# Patient Record
Sex: FEMALE | Race: WHITE | NOT HISPANIC OR LATINO | Employment: OTHER | URBAN - METROPOLITAN AREA
[De-identification: names, ages, dates, MRNs, and addresses within clinical notes are randomized per-mention and may not be internally consistent; named-entity substitution may affect disease eponyms.]

---

## 2017-10-01 ENCOUNTER — APPOINTMENT (EMERGENCY)
Dept: CT IMAGING | Facility: HOSPITAL | Age: 47
End: 2017-10-01
Payer: COMMERCIAL

## 2017-10-01 ENCOUNTER — HOSPITAL ENCOUNTER (OUTPATIENT)
Facility: HOSPITAL | Age: 47
Setting detail: OBSERVATION
Discharge: HOME | End: 2017-10-03
Attending: EMERGENCY MEDICINE | Admitting: INTERNAL MEDICINE
Payer: COMMERCIAL

## 2017-10-01 ENCOUNTER — APPOINTMENT (EMERGENCY)
Dept: RADIOLOGY | Facility: HOSPITAL | Age: 47
End: 2017-10-01
Payer: COMMERCIAL

## 2017-10-01 DIAGNOSIS — G45.9 TIA (TRANSIENT ISCHEMIC ATTACK): Primary | ICD-10-CM

## 2017-10-01 LAB
ALBUMIN SERPL BCP-MCNC: 3.1 G/DL (ref 3.5–5)
ALP SERPL-CCNC: 75 U/L (ref 46–116)
ALT SERPL W P-5'-P-CCNC: 19 U/L (ref 12–78)
ANION GAP SERPL CALCULATED.3IONS-SCNC: 9 MMOL/L (ref 4–13)
AST SERPL W P-5'-P-CCNC: 9 U/L (ref 5–45)
BASOPHILS # BLD AUTO: 0.09 THOUSANDS/ΜL (ref 0–0.1)
BASOPHILS NFR BLD AUTO: 1 % (ref 0–1)
BILIRUB SERPL-MCNC: 0.3 MG/DL (ref 0.2–1)
BUN SERPL-MCNC: 16 MG/DL (ref 5–25)
CALCIUM SERPL-MCNC: 8.7 MG/DL (ref 8.3–10.1)
CHLORIDE SERPL-SCNC: 102 MMOL/L (ref 100–108)
CO2 SERPL-SCNC: 29 MMOL/L (ref 21–32)
CREAT SERPL-MCNC: 0.89 MG/DL (ref 0.6–1.3)
EOSINOPHIL # BLD AUTO: 2.41 THOUSAND/ΜL (ref 0–0.61)
EOSINOPHIL NFR BLD AUTO: 13 % (ref 0–6)
ERYTHROCYTE [DISTWIDTH] IN BLOOD BY AUTOMATED COUNT: 13.5 % (ref 11.6–15.1)
GFR SERPL CREATININE-BSD FRML MDRD: 77 ML/MIN/1.73SQ M
GLUCOSE SERPL-MCNC: 104 MG/DL (ref 65–140)
HCT VFR BLD AUTO: 38.2 % (ref 34.8–46.1)
HGB BLD-MCNC: 12.3 G/DL (ref 11.5–15.4)
INR PPP: 0.84 (ref 0.86–1.16)
LYMPHOCYTES # BLD AUTO: 3.68 THOUSANDS/ΜL (ref 0.6–4.47)
LYMPHOCYTES NFR BLD AUTO: 20 % (ref 14–44)
MCH RBC QN AUTO: 28.5 PG (ref 26.8–34.3)
MCHC RBC AUTO-ENTMCNC: 32.2 G/DL (ref 31.4–37.4)
MCV RBC AUTO: 88 FL (ref 82–98)
MONOCYTES # BLD AUTO: 0.81 THOUSAND/ΜL (ref 0.17–1.22)
MONOCYTES NFR BLD AUTO: 4 % (ref 4–12)
NEUTROPHILS # BLD AUTO: 11.57 THOUSANDS/ΜL (ref 1.85–7.62)
NEUTS SEG NFR BLD AUTO: 62 % (ref 43–75)
NRBC BLD AUTO-RTO: 0 /100 WBCS
PLATELET # BLD AUTO: 403 THOUSANDS/UL (ref 149–390)
PMV BLD AUTO: 9 FL (ref 8.9–12.7)
POTASSIUM SERPL-SCNC: 3.5 MMOL/L (ref 3.5–5.3)
PROT SERPL-MCNC: 7.1 G/DL (ref 6.4–8.2)
PROTHROMBIN TIME: 11.7 SECONDS (ref 12.1–14.4)
RBC # BLD AUTO: 4.32 MILLION/UL (ref 3.81–5.12)
SODIUM SERPL-SCNC: 140 MMOL/L (ref 136–145)
TROPONIN I SERPL-MCNC: <0.02 NG/ML
WBC # BLD AUTO: 18.65 THOUSAND/UL (ref 4.31–10.16)

## 2017-10-01 PROCEDURE — 71020 HB CHEST X-RAY 2VW FRONTAL&LATL: CPT

## 2017-10-01 PROCEDURE — 96361 HYDRATE IV INFUSION ADD-ON: CPT

## 2017-10-01 PROCEDURE — 36415 COLL VENOUS BLD VENIPUNCTURE: CPT | Performed by: EMERGENCY MEDICINE

## 2017-10-01 PROCEDURE — 93005 ELECTROCARDIOGRAM TRACING: CPT | Performed by: EMERGENCY MEDICINE

## 2017-10-01 PROCEDURE — 96360 HYDRATION IV INFUSION INIT: CPT

## 2017-10-01 PROCEDURE — 85610 PROTHROMBIN TIME: CPT | Performed by: EMERGENCY MEDICINE

## 2017-10-01 PROCEDURE — 80053 COMPREHEN METABOLIC PANEL: CPT | Performed by: EMERGENCY MEDICINE

## 2017-10-01 PROCEDURE — 70450 CT HEAD/BRAIN W/O DYE: CPT

## 2017-10-01 PROCEDURE — 85025 COMPLETE CBC W/AUTO DIFF WBC: CPT | Performed by: EMERGENCY MEDICINE

## 2017-10-01 PROCEDURE — 84484 ASSAY OF TROPONIN QUANT: CPT | Performed by: EMERGENCY MEDICINE

## 2017-10-01 RX ORDER — IBUPROFEN 800 MG/1
TABLET ORAL AS NEEDED
COMMUNITY

## 2017-10-01 RX ORDER — PANTOPRAZOLE SODIUM 20 MG/1
40 TABLET, DELAYED RELEASE ORAL DAILY
COMMUNITY
End: 2018-06-13 | Stop reason: SDUPTHER

## 2017-10-01 RX ORDER — LOVASTATIN 20 MG/1
30 TABLET ORAL
COMMUNITY

## 2017-10-01 RX ORDER — DOCUSATE SODIUM 100 MG/1
100 CAPSULE, LIQUID FILLED ORAL DAILY
COMMUNITY

## 2017-10-01 RX ORDER — LISINOPRIL 5 MG/1
5 TABLET ORAL DAILY
COMMUNITY
End: 2018-06-18 | Stop reason: ALTCHOICE

## 2017-10-01 RX ORDER — ASPIRIN 81 MG/1
81 TABLET ORAL DAILY
COMMUNITY
End: 2018-06-13 | Stop reason: SDUPTHER

## 2017-10-01 RX ORDER — HYDROCHLOROTHIAZIDE 25 MG/1
25 TABLET ORAL DAILY
COMMUNITY
End: 2018-02-14

## 2017-10-01 RX ORDER — MELATONIN
1000 DAILY
COMMUNITY
End: 2018-06-13 | Stop reason: SDUPTHER

## 2017-10-01 RX ADMIN — SODIUM CHLORIDE 1000 ML: 0.9 INJECTION, SOLUTION INTRAVENOUS at 21:30

## 2017-10-01 NOTE — ED PROVIDER NOTES
History  Chief Complaint   Patient presents with    Numbness     Patient presents to the ED with c/o numbness starting behind her right ear, down her right arm, and into her right leg  Patient states that this has been happening for about a week and was seen at [facility] where stroke was ruled out     42-year-old female presents for evaluation of right-sided weakness and numbness that started approximately 1 week ago however worsened today  She was seen at [facility] 1 week ago when this initially started in had a stroke evaluation performed  This returned normal, she was discharged  She is unable to get follow-up with a neurologist until November 10th  Today she awoke an stated that she had worsening weakness in her right side, now with slurred speech, right-sided facial droop, difficulty with ambulation because of the leg weakness  This is worse than her symptoms were in the past when she was admitted to [facility]  She has a hx of tia in the past which appeared similar  She has no other complaints at this time  She denies CP, no SOB, no fever/chills, no N/V/D/C, no urinary symptoms            Prior to Admission Medications   Prescriptions Last Dose Informant Patient Reported?  Taking?   aspirin (ECOTRIN LOW STRENGTH) 81 mg EC tablet   Yes Yes   Sig: Take 81 mg by mouth daily   cholecalciferol (VITAMIN D3) 1,000 units tablet   Yes Yes   Sig: Take 1,000 Units by mouth daily   docusate sodium (COLACE) 100 mg capsule   Yes Yes   Sig: Take 100 mg by mouth 2 (two) times a day   hydrochlorothiazide (HYDRODIURIL) 25 mg tablet   Yes Yes   Sig: Take 25 mg by mouth daily   ibuprofen (MOTRIN) 800 mg tablet   Yes Yes   Sig: Take by mouth every 6 (six) hours as needed for mild pain   lisinopril (ZESTRIL) 5 mg tablet   Yes Yes   Sig: Take 5 mg by mouth daily   lovastatin (MEVACOR) 20 mg tablet   Yes Yes   Sig: Take 20 mg by mouth daily at bedtime   pantoprazole (PROTONIX) 20 mg tablet   Yes Yes   Sig: Take 40 mg by mouth daily      Facility-Administered Medications: None       Past Medical History:   Diagnosis Date    Hypertension        No past surgical history on file  No family history on file  I have reviewed and agree with the history as documented  Social History   Substance Use Topics    Smoking status: Not on file    Smokeless tobacco: Not on file    Alcohol use Not on file        Review of Systems   Constitutional: Negative for chills, fatigue and fever  HENT: Negative for congestion and sore throat  Eyes: Positive for visual disturbance  Negative for photophobia  Respiratory: Negative for cough, chest tightness and shortness of breath  Cardiovascular: Negative for chest pain and palpitations  Gastrointestinal: Negative for abdominal pain, constipation, diarrhea, nausea and vomiting  Genitourinary: Negative for dysuria and flank pain  Musculoskeletal: Negative for back pain, neck pain and neck stiffness  Skin: Negative for color change and rash  Allergic/Immunologic: Negative for immunocompromised state  Neurological: Positive for facial asymmetry, speech difficulty, weakness, numbness and headaches  Negative for dizziness and syncope  Psychiatric/Behavioral: Negative for confusion  Physical Exam  ED Triage Vitals   Temperature Pulse Respirations Blood Pressure SpO2   10/01/17 1842 10/01/17 1839 10/01/17 1839 10/01/17 1839 10/01/17 1839   (!) 97 3 °F (36 3 °C) (!) 107 18 125/83 98 %      Temp Source Heart Rate Source Patient Position - Orthostatic VS BP Location FiO2 (%)   10/01/17 1842 10/01/17 2127 10/01/17 2127 10/01/17 2127 --   Temporal Monitor Lying Left arm       Pain Score       --                  Physical Exam   Constitutional: She is oriented to person, place, and time  She appears well-developed and well-nourished  No distress  HENT:   Head: Normocephalic and atraumatic     Mouth/Throat: Oropharynx is clear and moist    Mild right facial droop with mild slurred speech   Eyes: Conjunctivae and EOM are normal  Pupils are equal, round, and reactive to light  Right eye exhibits no discharge  Left eye exhibits no discharge  No scleral icterus  No visual field deficit, no nystagmus   Neck: Normal range of motion  Neck supple  No JVD present  Cardiovascular: Normal rate, regular rhythm, normal heart sounds and intact distal pulses  Exam reveals no gallop and no friction rub  No murmur heard  Pulmonary/Chest: Effort normal and breath sounds normal  No respiratory distress  She has no wheezes  She has no rales  She exhibits no tenderness  Abdominal: Soft  Bowel sounds are normal  She exhibits no distension  There is no tenderness  There is no guarding  Musculoskeletal: She exhibits no edema, tenderness or deformity  Right arm drift, right leg weakness  Neurological: She is alert and oriented to person, place, and time  A cranial nerve deficit is present  Mild right-sided facial droop with mild slurred speech, difficulty with word finding  Skin: Skin is warm and dry  No rash noted  She is not diaphoretic  No erythema  No pallor  Psychiatric: She has a normal mood and affect  Her behavior is normal    Vitals reviewed        ED Medications  Medications   sodium chloride 0 9 % bolus 1,000 mL (1,000 mL Intravenous New Bag 10/1/17 2130)       Diagnostic Studies  Labs Reviewed   CBC AND DIFFERENTIAL - Abnormal        Result Value Ref Range Status    WBC 18 65 (*) 4 31 - 10 16 Thousand/uL Final    Platelets 214 (*) 170 - 390 Thousands/uL Final    Eosinophils Relative 13 (*) 0 - 6 % Final    Neutrophils Absolute 11 57 (*) 1 85 - 7 62 Thousands/µL Final    Eosinophils Absolute 2 41 (*) 0 00 - 0 61 Thousand/µL Final    RBC 4 32  3 81 - 5 12 Million/uL Final    Hemoglobin 12 3  11 5 - 15 4 g/dL Final    Hematocrit 38 2  34 8 - 46 1 % Final    MCV 88  82 - 98 fL Final    MCH 28 5  26 8 - 34 3 pg Final    MCHC 32 2  31 4 - 37 4 g/dL Final    RDW 13 5  11 6 - 15 1 % Final    MPV 9 0  8 9 - 12 7 fL Final    nRBC 0  /100 WBCs Final    Neutrophils Relative 62  43 - 75 % Final    Lymphocytes Relative 20  14 - 44 % Final    Monocytes Relative 4  4 - 12 % Final    Basophils Relative 1  0 - 1 % Final    Lymphocytes Absolute 3 68  0 60 - 4 47 Thousands/µL Final    Monocytes Absolute 0 81  0 17 - 1 22 Thousand/µL Final    Basophils Absolute 0 09  0 00 - 0 10 Thousands/µL Final   COMPREHENSIVE METABOLIC PANEL - Abnormal     Albumin 3 1 (*) 3 5 - 5 0 g/dL Final    Sodium 140  136 - 145 mmol/L Final    Potassium 3 5  3 5 - 5 3 mmol/L Final    Chloride 102  100 - 108 mmol/L Final    CO2 29  21 - 32 mmol/L Final    Anion Gap 9  4 - 13 mmol/L Final    BUN 16  5 - 25 mg/dL Final    Creatinine 0 89  0 60 - 1 30 mg/dL Final    Comment: Standardized to IDMS reference method    Glucose 104  65 - 140 mg/dL Final    Comment:   If the patient is fasting, the ADA then defines impaired fasting glucose as > 100 mg/dL and diabetes as > or equal to 123 mg/dL  Specimen collection should occur prior to Sulfasalazine administration due to the potential for falsely depressed results  Specimen collection should occur prior to Sulfapyridine administration due to the potential for falsely elevated results  Calcium 8 7  8 3 - 10 1 mg/dL Final    AST 9  5 - 45 U/L Final    Comment:   Specimen collection should occur prior to Sulfasalazine administration due to the potential for falsely depressed results  ALT 19  12 - 78 U/L Final    Comment:   Specimen collection should occur prior to Sulfasalazine administration due to the potential for falsely depressed results       Alkaline Phosphatase 75  46 - 116 U/L Final    Total Protein 7 1  6 4 - 8 2 g/dL Final    Total Bilirubin 0 30  0 20 - 1 00 mg/dL Final    eGFR 77  ml/min/1 73sq m Final    Narrative:     National Kidney Disease Education Program recommendations are as follows:  GFR calculation is accurate only with a steady state creatinine  Chronic Kidney disease less than 60 ml/min/1 73 sq  meters  Kidney failure less than 15 ml/min/1 73 sq  meters  PROTIME-INR - Abnormal     Protime 11 7 (*) 12 1 - 14 4 seconds Final    INR 0 84 (*) 0 86 - 1 16 Final   TROPONIN I - Normal    Troponin I <0 02  <=0 04 ng/mL Final    Comment: 3Autovalidation override    Narrative:     [employer] Chemistry analyzer 99% cutoff is > 0 04 ng/mL in network labs    o cTnI 99% cutoff is useful only when applied to patients in the clinical setting of myocardial ischemia  o cTnI 99% cutoff should be interpreted in the context of clinical history, ECG findings and possibly cardiac imaging to establish correct diagnosis  o cTnI 99% cutoff may be suggestive but clearly not indicative of a coronary event without the clinical setting of myocardial ischemia  X-ray chest 2 views   ED Interpretation   No acute cardiopulmonary abnormalities      CT head without contrast   ED Interpretation   1  No acute intracranial abnormality  Microangiopathic changes        2   Small calcified extra-axial focus right frontal lobe, likely   meningioma  Final Result      1  No acute intracranial abnormality  Microangiopathic changes  2   Small calcified extra-axial focus right frontal lobe, likely meningioma           Workstation performed: [id number]             Procedures  ECG 12 Lead Documentation  Date/Time: 10/1/2017 6:48 PM  Performed by: Noelle Brock by: Sherry Ruiz     Indications / Diagnosis:  Stroke symptoms  ECG reviewed by me, the ED Provider: yes    Patient location:  ED  Previous ECG:     Previous ECG:  Unavailable    Comparison to cardiac monitor: Yes    Interpretation:     Interpretation: non-specific    Rate:     ECG rate:  104    ECG rate assessment: tachycardic    Rhythm:     Rhythm: sinus tachycardia    Ectopy:     Ectopy: none    QRS:     QRS axis:  Normal    QRS intervals:  Normal  Conduction:     Conduction: normal    ST segments: ST segments:  Non-specific  T waves:     T waves: normal            Phone Contacts  ED Phone Contact    ED Course  ED Course as of Oct 02 0039   Isael Sidhu Oct 01, 2017   2132 WBC: (!) 18 65             NIH Stroke Scale    Flowsheet Row Most Recent Value   Level of Consciousness (1a )  0 Filed at: 10/02/2017 0026   LOC Questions (1b )  0 Filed at: 10/02/2017 0026   LOC Commands (1c )  0 Filed at: 10/02/2017 0026   Best Gaze (2 )  0 Filed at: 10/02/2017 0026   Visual (3 )  0 Filed at: 10/02/2017 0026   Facial Palsy (4 )  1 Filed at: 10/02/2017 0026   Motor Arm, Left (5a )  0 Filed at: 10/02/2017 0026   Motor Arm, Right (5b )  1 Filed at: 10/02/2017 0026   Motor Leg, Left (6a )  0 Filed at: 10/02/2017 0026   Motor Leg, Right (6b )  2 Filed at: 10/02/2017 0026   Limb Ataxia (7 )  0 Filed at: 10/02/2017 0026   Sensory (8 )  0 Filed at: 10/02/2017 0026   Best Language (9 )  1 Filed at: 10/02/2017 0026   Dysarthria (10 )  1 Filed at: 10/02/2017 0026   Extinction and Inattention (11 ) (Formerly Neglect)  0 Filed at: 10/02/2017 0026   Total  6 Filed at: 10/02/2017 0026                        MDM  Number of Diagnoses or Management Options  TIA (transient ischemic attack):   Diagnosis management comments: Stroke symptoms  Was recently admitted to [facility] and had a full negative stroke workup including MRI however now her symptoms are worsening  She will have a full stroke workup performed here and likely will need admission to the hospital for further evaluation, Neurology follow-up, echocardiogram, etc   Patient is agreeable to this plan  CritCare Time    Disposition  Final diagnoses:   TIA (transient ischemic attack)     ED Disposition     ED Disposition Condition Comment    Admit  Case was discussed with Atif Arvizu [facility]) and the patient's admission status was agreed to be Admission Status: observation status to the service of Dr Yris Angel [facility])           Follow-up Information    None       Patient's Medications   Discharge Prescriptions    No medications on file     No discharge procedures on file      ED Provider  Electronically Signed by       Adrianne Hunter DO  10/02/17 6280

## 2017-10-02 ENCOUNTER — APPOINTMENT (OUTPATIENT)
Dept: MRI IMAGING | Facility: HOSPITAL | Age: 47
End: 2017-10-02
Payer: COMMERCIAL

## 2017-10-02 ENCOUNTER — APPOINTMENT (OUTPATIENT)
Dept: ULTRASOUND IMAGING | Facility: HOSPITAL | Age: 47
End: 2017-10-02
Payer: COMMERCIAL

## 2017-10-02 ENCOUNTER — APPOINTMENT (OUTPATIENT)
Dept: NON INVASIVE DIAGNOSTICS | Facility: CLINIC | Age: 47
End: 2017-10-02
Payer: COMMERCIAL

## 2017-10-02 ENCOUNTER — APPOINTMENT (OUTPATIENT)
Dept: NON INVASIVE DIAGNOSTICS | Facility: HOSPITAL | Age: 47
End: 2017-10-02
Payer: COMMERCIAL

## 2017-10-02 PROBLEM — I10 HTN (HYPERTENSION): Chronic | Status: ACTIVE | Noted: 2017-10-02

## 2017-10-02 PROBLEM — G45.9 TIA (TRANSIENT ISCHEMIC ATTACK): Status: ACTIVE | Noted: 2017-10-02

## 2017-10-02 LAB
ANION GAP SERPL CALCULATED.3IONS-SCNC: 7 MMOL/L (ref 4–13)
ATRIAL RATE: 104 BPM
BUN SERPL-MCNC: 17 MG/DL (ref 5–25)
CALCIUM SERPL-MCNC: 8.2 MG/DL (ref 8.3–10.1)
CHLORIDE SERPL-SCNC: 105 MMOL/L (ref 100–108)
CHOLEST SERPL-MCNC: 201 MG/DL (ref 50–200)
CO2 SERPL-SCNC: 29 MMOL/L (ref 21–32)
CREAT SERPL-MCNC: 0.78 MG/DL (ref 0.6–1.3)
ERYTHROCYTE [SEDIMENTATION RATE] IN BLOOD: 31 MM/HOUR (ref 0–20)
EST. AVERAGE GLUCOSE BLD GHB EST-MCNC: 126 MG/DL
GFR SERPL CREATININE-BSD FRML MDRD: 90 ML/MIN/1.73SQ M
GLUCOSE SERPL-MCNC: 132 MG/DL (ref 65–140)
HBA1C MFR BLD: 6 % (ref 4.2–6.3)
HDLC SERPL-MCNC: 42 MG/DL (ref 40–60)
LDLC SERPL CALC-MCNC: 123 MG/DL (ref 0–100)
P AXIS: 25 DEGREES
POTASSIUM SERPL-SCNC: 3.7 MMOL/L (ref 3.5–5.3)
PR INTERVAL: 136 MS
QRS AXIS: -15 DEGREES
QRSD INTERVAL: 94 MS
QT INTERVAL: 352 MS
QTC INTERVAL: 462 MS
SODIUM SERPL-SCNC: 141 MMOL/L (ref 136–145)
T WAVE AXIS: 19 DEGREES
TRIGL SERPL-MCNC: 178 MG/DL
VENTRICULAR RATE: 104 BPM

## 2017-10-02 PROCEDURE — G8988 SELF CARE GOAL STATUS: HCPCS

## 2017-10-02 PROCEDURE — 80061 LIPID PANEL: CPT | Performed by: PHYSICIAN ASSISTANT

## 2017-10-02 PROCEDURE — 85652 RBC SED RATE AUTOMATED: CPT | Performed by: PSYCHIATRY & NEUROLOGY

## 2017-10-02 PROCEDURE — 99285 EMERGENCY DEPT VISIT HI MDM: CPT

## 2017-10-02 PROCEDURE — 97166 OT EVAL MOD COMPLEX 45 MIN: CPT

## 2017-10-02 PROCEDURE — 70551 MRI BRAIN STEM W/O DYE: CPT

## 2017-10-02 PROCEDURE — G8979 MOBILITY GOAL STATUS: HCPCS

## 2017-10-02 PROCEDURE — G8978 MOBILITY CURRENT STATUS: HCPCS

## 2017-10-02 PROCEDURE — 36415 COLL VENOUS BLD VENIPUNCTURE: CPT | Performed by: PHYSICIAN ASSISTANT

## 2017-10-02 PROCEDURE — 80048 BASIC METABOLIC PNL TOTAL CA: CPT | Performed by: PHYSICIAN ASSISTANT

## 2017-10-02 PROCEDURE — 83036 HEMOGLOBIN GLYCOSYLATED A1C: CPT | Performed by: PHYSICIAN ASSISTANT

## 2017-10-02 PROCEDURE — 93306 TTE W/DOPPLER COMPLETE: CPT

## 2017-10-02 PROCEDURE — 93880 EXTRACRANIAL BILAT STUDY: CPT

## 2017-10-02 PROCEDURE — 97163 PT EVAL HIGH COMPLEX 45 MIN: CPT

## 2017-10-02 PROCEDURE — G8987 SELF CARE CURRENT STATUS: HCPCS

## 2017-10-02 PROCEDURE — 86618 LYME DISEASE ANTIBODY: CPT | Performed by: PSYCHIATRY & NEUROLOGY

## 2017-10-02 RX ORDER — ONDANSETRON 2 MG/ML
4 INJECTION INTRAMUSCULAR; INTRAVENOUS EVERY 6 HOURS PRN
Status: DISCONTINUED | OUTPATIENT
Start: 2017-10-02 | End: 2017-10-03 | Stop reason: HOSPADM

## 2017-10-02 RX ORDER — PANTOPRAZOLE SODIUM 40 MG/1
40 TABLET, DELAYED RELEASE ORAL
Status: DISCONTINUED | OUTPATIENT
Start: 2017-10-02 | End: 2017-10-03 | Stop reason: HOSPADM

## 2017-10-02 RX ORDER — DOCUSATE SODIUM 100 MG/1
100 CAPSULE, LIQUID FILLED ORAL 2 TIMES DAILY
Status: DISCONTINUED | OUTPATIENT
Start: 2017-10-02 | End: 2017-10-03 | Stop reason: HOSPADM

## 2017-10-02 RX ORDER — MELATONIN
1000 DAILY
Status: DISCONTINUED | OUTPATIENT
Start: 2017-10-02 | End: 2017-10-03 | Stop reason: HOSPADM

## 2017-10-02 RX ORDER — LISINOPRIL 5 MG/1
5 TABLET ORAL DAILY
Status: DISCONTINUED | OUTPATIENT
Start: 2017-10-02 | End: 2017-10-03 | Stop reason: HOSPADM

## 2017-10-02 RX ORDER — ACETAMINOPHEN 325 MG/1
650 TABLET ORAL EVERY 4 HOURS PRN
Status: DISCONTINUED | OUTPATIENT
Start: 2017-10-02 | End: 2017-10-03 | Stop reason: HOSPADM

## 2017-10-02 RX ORDER — CALCIUM CARBONATE 200(500)MG
1000 TABLET,CHEWABLE ORAL DAILY PRN
Status: DISCONTINUED | OUTPATIENT
Start: 2017-10-02 | End: 2017-10-03 | Stop reason: HOSPADM

## 2017-10-02 RX ORDER — ASPIRIN 81 MG/1
81 TABLET ORAL DAILY
Status: DISCONTINUED | OUTPATIENT
Start: 2017-10-02 | End: 2017-10-03 | Stop reason: HOSPADM

## 2017-10-02 RX ORDER — HYDROCHLOROTHIAZIDE 25 MG/1
25 TABLET ORAL DAILY
Status: DISCONTINUED | OUTPATIENT
Start: 2017-10-02 | End: 2017-10-03 | Stop reason: HOSPADM

## 2017-10-02 RX ORDER — PRAVASTATIN SODIUM 20 MG
20 TABLET ORAL
Status: DISCONTINUED | OUTPATIENT
Start: 2017-10-02 | End: 2017-10-03 | Stop reason: HOSPADM

## 2017-10-02 RX ADMIN — DOCUSATE SODIUM 100 MG: 100 CAPSULE, LIQUID FILLED ORAL at 09:52

## 2017-10-02 RX ADMIN — ASPIRIN 81 MG: 81 TABLET, COATED ORAL at 09:53

## 2017-10-02 RX ADMIN — ACETAMINOPHEN 650 MG: 325 TABLET ORAL at 13:39

## 2017-10-02 RX ADMIN — LISINOPRIL 5 MG: 5 TABLET ORAL at 09:52

## 2017-10-02 RX ADMIN — ACETAMINOPHEN 650 MG: 325 TABLET ORAL at 22:04

## 2017-10-02 RX ADMIN — ACETAMINOPHEN 650 MG: 325 TABLET ORAL at 18:22

## 2017-10-02 RX ADMIN — ENOXAPARIN SODIUM 40 MG: 40 INJECTION SUBCUTANEOUS at 09:55

## 2017-10-02 RX ADMIN — PANTOPRAZOLE SODIUM 40 MG: 40 TABLET, DELAYED RELEASE ORAL at 06:33

## 2017-10-02 RX ADMIN — VITAMIN D, TAB 1000IU (100/BT) 1000 UNITS: 25 TAB at 09:53

## 2017-10-02 RX ADMIN — PRAVASTATIN SODIUM 20 MG: 20 TABLET ORAL at 21:59

## 2017-10-02 RX ADMIN — HYDROCHLOROTHIAZIDE 25 MG: 25 TABLET ORAL at 09:55

## 2017-10-02 NOTE — CASE MANAGEMENT
[address] [facility] in the [facility] by Ayush Dominguez for 2017  Network Utilization Review Department  Phone: [phone]; Fax [phone]  ATTENTION: The Network Utilization Review Department is now centralized for our 7 Facilities  Make a note that we have a new phone and fax numbers for our Department  Please call with any questions or concerns to [phone] and carefully follow the prompts so that you are directed to the right person  All voicemails are confidential  Fax any determinations, approvals, denials, and requests for initial or continue stay review clinical to [phone]  Due to HIGH CALL volume, it would be easier if you could please send faxed requests to expedite your requests and in part, help us provide discharge notifications faster  Initial Clinical Review    Admission: Date/Time/Statement:OBS  10/1/17 @2303    Orders Placed This Encounter   Procedures    Place in Observation (expected length of stay for this patient is less than two midnights)     Standing Status:   Standing     Number of Occurrences:   1     Order Specific Question:   Admitting Physician     Answer:   Stacie Marie [[id number]]     Order Specific Question:   Level of Care     Answer:   Med Surg [16]     Order Specific Question:   Bed request comments     Answer:   tele     ED: Date/Time/Mode of Arrival:   ED Arrival Information     Expected Arrival Acuity Means of Arrival Escorted By Service Admission Type    - 10/1/2017 18:26 Urgent Walk-In Family Member General Medicine Urgent    Arrival Complaint    WEAKNESS, NUMBNESS IN ARM        Chief Complaint:   Chief Complaint   Patient presents with    Numbness     Patient presents to the ED with c/o numbness starting behind her right ear, down her right arm, and into her right leg   Patient states that this has been happening for about a week and was seen at [facility] where stroke was ruled out     History of Illness: Loretto Favre is a 49 y o  female who presents with complaints of numbness of the right side of her neck radiating down her right arm and right leg  Pt states that her right arm and leg feel heavy also  Pt states that the numbness began last week  Pt was seen at [address] [place] [address] last week and had an MRI completed  Per the patient, the MRI was normal   PT states that the symptoms were persistent, but increased today  Pt states that she feel like it takes her longer to speak and has difficulty walking now       ED Vital Signs:   ED Triage Vitals   Temperature Pulse Respirations Blood Pressure SpO2   10/01/17 1842 10/01/17 1839 10/01/17 1839 10/01/17 1839 10/01/17 1839   (!) 97 3 °F (36 3 °C) (!) 107 18 125/83 98 %      Temp Source Heart Rate Source Patient Position - Orthostatic VS BP Location FiO2 (%)   10/01/17 1842 10/01/17 2127 10/01/17 2127 10/01/17 2127 --   Temporal Monitor Lying Left arm       Pain Score       10/02/17 0742       6        Wt Readings from Last 1 Encounters:   10/01/17 129 kg (283 lb 8 2 oz)     Abnormal Labs/Diagnostic Test Results:   10/1: alb 3 1   Wbc 18 65   Pt 11 7   inr  84   10/2: ca 8 2       ED Treatment:   Medication Administration from 10/01/2017 1826 to 10/02/2017 1022       Date/Time Order Dose Route Action Action by Comments     10/01/2017 2130 sodium chloride 0 9 % bolus 1,000 mL 1,000 mL Intravenous [facility]      10/02/2017 3859 aspirin (ECOTRIN LOW STRENGTH) EC tablet 81 mg 81 mg Oral Given Gabriel Rangel RN      10/02/2017 0955 enoxaparin (LOVENOX) subcutaneous injection 40 mg 40 mg Subcutaneous Given Gabriel Rangel RN           Past Medical/Surgical History:    Active Ambulatory Problems     Diagnosis Date Noted    No Active Ambulatory Problems     Resolved Ambulatory Problems     Diagnosis Date Noted    No Resolved Ambulatory Problems     Past Medical History:   Diagnosis Date    Hypertension        Admitting Diagnosis: Numbness [R20 0]    Age/Sex: 49 y o  female    Assessment/Plan: Principal Problem:    TIA (transient ischemic attack)  Active Problems:    HTN (hypertension)        Plan for the Primary Problem(s):  · TIA  ? Admit telemetry  ? Neurology consult  ? Neuro checks  ? Lipid panel pending  ? Continue statin  ? MRI brain pending     Plan for Additional Problems:   · HTN - continue lisinopril and HCTZ     VTE Prophylaxis: Enoxaparin (Lovenox)  / sequential compression device   Code Status: Full  POLST: There is no POLST form on file for this patient (pre-hospital)     Anticipated Length of Stay:  Patient will be admitted on an Observation basis with an anticipated length of stay of  Less than 2 midnights     Justification for Hospital Stay: Neurology evaluation    Admission Orders:  Scheduled Meds:   aspirin 81 mg Oral Daily   cholecalciferol 1,000 Units Oral Daily   docusate sodium 100 mg Oral BID   enoxaparin 40 mg Subcutaneous Daily   hydrochlorothiazide 25 mg Oral Daily   lisinopril 5 mg Oral Daily   pantoprazole 40 mg Oral Early Morning   pravastatin 20 mg Oral Daily With Dinner   PRN Meds:   acetaminophen    calcium carbonate    ondansetron     HSE DIET  Up w/assist  Incentive spirom hourly wa  Stroke education  NIH stroke scale  Neuro checks & vitals Every hour x 4 hours; then every 2 hours x 4 hours, then every 4 hours x 72 hours;  then every 8 hours if stable  Dysphagia eval prior to PO intake  Echo  MRI c spine  Lyme antb profile in am  ESR in am  Carotid doppler  Cons PT/OT/speech  Cons case mgmt  Cons nutrition  Cons physical med & rehab  Cons neuro  SCD's    PER NEURO 10/2:  Maddie Vargas is a right handed  49 y o  female with past medical history of hypertension, TIA in 2011 on aspirin of 81 mg who has been admitted into the hospital yesterday with complaints of numbness of her right side of her neck radiating to her arm and leg, according to the patient her symptoms started last Saturday, it came on slowly, he did not have any involvement of the face, no headaches, no speech difficulty, he was admitted to [facility] and had an MRI scan of the brain which according to the patient was told it was normal and was discharged, according to the patient her symptoms persisted and yesterday she was having slight difficulty in ambulating and feels heaviness on the right side, she denies having any headache, no dizziness, denies any speech difficulty, no difficulty in swallowing, she feels her symptoms get better with rest, no bowel or bladder incontinence, no history of recent, no symptoms on the left side, no seizures no other neurological complaints, pertinent review of systems as per HPI, other review of systems negative in detail,     Assessment:  1   Right-sided numbness with no evidence of acute stroke on the MRI scan      Plan:  Continue with aspirin, will check an MRI scan of the C-spine, carotid ultrasound, cardiac monitoring, Lyme titer, keep blood pressure cholesterol and sugar under control, would recommend evaluation with an MS specialist for T2 white matter changes as an outpatient, patient can follow up with her outpatient neurologist Dr Lan Lyons at [facility], physical therapy occupational therapy and rehab, other management as per primary team

## 2017-10-02 NOTE — CONSULTS
Consultation - Neurology   Grabiel Cunningham 49 y o  female MRN: [id number]  Unit/Bed#: ED 07 Encounter: [id number]      Physician Requesting Consult: Kalpana Lyn MD  Reason for Consult:  Right-sided numbness  Hx and PE limited by:     HPI: Grabiel Cunningham is a right handed  49 y o  female with past medical history of hypertension, TIA in 2011 on aspirin of 81 mg who has been admitted into the hospital yesterday with complaints of numbness of her right side of her neck radiating to her arm and leg, according to the patient her symptoms started last Saturday, it came on slowly, he did not have any involvement of the face, no headaches, no speech difficulty, he was admitted to [facility] [place] and had an MRI scan of the brain which according to the patient was told it was normal and was discharged, according to the patient her symptoms persisted and yesterday she was having slight difficulty in ambulating and feels heaviness on the right side, she denies having any headache, no dizziness, denies any speech difficulty, no difficulty in swallowing, she feels her symptoms get better with rest, no bowel or bladder incontinence, no history of recent, no symptoms on the left side, no seizures no other neurological complaints, pertinent review of systems as per HPI, other review of systems negative in detail,    Review of Systems:  10 point review of systems from admitting physician on 10/02/2017 were reviewed, exceptions are as per history of present illness  Historical Information   Past Medical History:   Diagnosis Date    Hypertension      History reviewed  No pertinent surgical history  Social History   History   Smoking Status    Not on file   Smokeless Tobacco    Not on file     History   Alcohol use Not on file     History   Drug use: Unknown       Family History:   History reviewed  No pertinent family history      Allergies   Allergen Reactions    Iodinated Diagnostic Agents Hives    Penicillins Hives Meds:  All current active meds have been reviewed    Scheduled Meds:  aspirin 81 mg Oral Daily   cholecalciferol 1,000 Units Oral Daily   docusate sodium 100 mg Oral BID   enoxaparin 40 mg Subcutaneous Daily   hydrochlorothiazide 25 mg Oral Daily   lisinopril 5 mg Oral Daily   pantoprazole 40 mg Oral Early Morning   pravastatin 20 mg Oral Daily With Dinner     PRN Meds:   acetaminophen    calcium carbonate    ondansetron    Physical Exam:   Objective   Vitals:   Vitals:    10/02/17 0645   BP: 140/74   Pulse: 83   Resp: 20   Temp:    SpO2: 97%   ,There is no height or weight on file to calculate BMI  Patient was examined in emergency department bed  General appearance: Cooperative in no acute distress  Head & neck head is atraumatic and normocephalic  Neck is supple with full range of motion  No spine tenderness  Cardiovascular: Carotid arteriesno carotid bruits  Neurologic:   Mental statusthe patient is awake alert and oriented without aphasia or apraxia  Other high cortical  intellectual functions are intact  CN: Visual fields are full to confrontation, disks are flat, Extraocular movements are full without nystagmus  Pupils are 3 mm and reactive  Face is symmetrical to light touch  Movements of facial expression move symmetrically  Hearing is normal to finger rub bilaterally  Soft palate lifts symmetrically  Shoulder shrug is symmetrical  Tongue is midline without atrophy  Motor:  Questionable slight right arm drift very subtle, Strength is full in the upper and lower extremities with normal bulk and tone  Sensory: Intact to temperature and vibratory sensation in the upper and lower extremities bilaterally  Cortical function is intact  Coordination: Finger to nose testing is performed accurately    Slightly limited on the right side secondary to IV access Romberg and gait could not be tested  Reflexes: 2/4 and symmetrical in the biceps, triceps, brachial radialis, knee jerk and ankle jerk regions  Toes are downgoing  Lab Results:Lab Results:   CBC:   Results from last 7 days  Lab Units 10/01/17  2116   WBC Thousand/uL 18 65*   RBC Million/uL 4 32   HEMOGLOBIN g/dL 12 3   HEMATOCRIT % 38 2   MCV fL 88   PLATELETS Thousands/uL 403*   , BMP/CMP:   Results from last 7 days  Lab Units 10/02/17  0436 10/01/17  2116   SODIUM mmol/L 141 140   POTASSIUM mmol/L 3 7 3 5   CHLORIDE mmol/L 105 102   CO2 mmol/L 29 29   ANION GAP mmol/L 7 9   BUN mg/dL 17 16   CREATININE mg/dL 0 78 0 89   GLUCOSE RANDOM mg/dL 132 104   CALCIUM mg/dL 8 2* 8 7   AST U/L  --  9   ALT U/L  --  19   ALK PHOS U/L  --  75   TOTAL PROTEIN g/dL  --  7 1   ALBUMIN g/dL  --  3 1*   BILIRUBIN TOTAL mg/dL  --  0 30   EGFR ml/min/1 73sq m 90 77     I have personally reviewed pertinent reports  EEG, Echo, Pathology, and Other Studies: I have personally reviewed pertinent films in PACS with a Radiologist     Family, was not present at the bedside for history and examination  Assessment:  1  Right-sided numbness with no evidence of acute stroke on the MRI scan  Plan:  Continue with aspirin,Would recommend increasing aspirin to 325 mg once a day will check an MRI scan of the C-spine, carotid ultrasound, cardiac monitoring, Lyme titer, keep blood pressure cholesterol and sugar under control, would recommend evaluation with an MS specialist for T2 white matter changes as an outpatient,Would recommend getting the records from [facility] recent hospitalization, patient can follow up with her outpatient neurologist Dr Maliha Cesar at [facility], physical therapy occupational therapy and rehab, other management as per primary team       Counseling / Coordination of Care  Total time spent today 45 minutes  Greater than 50% of total time was spent with the patient and / or family counseling and / or coordination of care   A description of the counseling / coordination of care:     Petar Ndiaye MD  10/2/2017,9:46 AM    Dictation voice to text software has been used in the creation of this document  Please consider this in light of any contextual or grammatical errors

## 2017-10-02 NOTE — H&P
History and Physical - [facility] Internal Medicine    Patient Information: Lillie Mayfield 49 y o  female MRN: [id number]  Unit/Bed#: ED 07 Encounter: [id number]  Admitting Physician: Landry Schwartz PA-C  PCP: Lacey Ybarra MD  Date of Admission:  10/02/17    Assessment/Plan:    Hospital Problem List:     Principal Problem:    TIA (transient ischemic attack)  Active Problems:    HTN (hypertension)      Plan for the Primary Problem(s):  · TIA  · Admit telemetry  · Neurology consult  · Neuro checks  · Lipid panel pending  · Continue statin  · MRI brain pending    Plan for Additional Problems:   · HTN - continue lisinopril and HCTZ    VTE Prophylaxis: Enoxaparin (Lovenox)  / sequential compression device   Code Status: Full  POLST: There is no POLST form on file for this patient (pre-hospital)    Anticipated Length of Stay:  Patient will be admitted on an Observation basis with an anticipated length of stay of  Less than 2 midnights  Justification for Hospital Stay: Neurology evaluation    Total Time for Visit, including Counseling / Coordination of Care: 30 minutes  Greater than 50% of this total time spent on direct patient counseling and coordination of care  Chief Complaint:   Numbness    History of Present Illness:    Lillie Myafield is a 49 y o  female who presents with complaints of numbness of the right side of her neck radiating down her right arm and right leg  Pt states that her right arm and leg feel heavy also  Pt states that the numbness began last week  Pt was seen at [facility] last week and had an MRI completed  Per the patient, the MRI was normal   PT states that the symptoms were persistent, but increased today  Pt states that she feel like it takes her longer to speak and has difficulty walking now       Review of Systems:    Review of Systems   Neurological: Positive for numbness  All other systems reviewed and are negative        Past Medical and Surgical History:     Past Medical History: Diagnosis Date    Hypertension        History reviewed  No pertinent surgical history  Meds/Allergies:    Prior to Admission medications    Medication Sig Start Date End Date Taking? Authorizing Provider   aspirin (ECOTRIN LOW STRENGTH) 81 mg EC tablet Take 81 mg by mouth daily   Yes Historical Provider, MD   cholecalciferol (VITAMIN D3) 1,000 units tablet Take 1,000 Units by mouth daily   Yes Historical Provider, MD   docusate sodium (COLACE) 100 mg capsule Take 100 mg by mouth 2 (two) times a day   Yes Historical Provider, MD   hydrochlorothiazide (HYDRODIURIL) 25 mg tablet Take 25 mg by mouth daily   Yes Historical Provider, MD   ibuprofen (MOTRIN) 800 mg tablet Take by mouth every 6 (six) hours as needed for mild pain   Yes Historical Provider, MD   lisinopril (ZESTRIL) 5 mg tablet Take 5 mg by mouth daily   Yes Historical Provider, MD   lovastatin (MEVACOR) 20 mg tablet Take 20 mg by mouth daily at bedtime   Yes Historical Provider, MD   pantoprazole (PROTONIX) 20 mg tablet Take 40 mg by mouth daily   Yes Historical Provider, MD     I have reviewed home medications with patient personally  Allergies: Allergies   Allergen Reactions    Iodinated Diagnostic Agents Hives    Penicillins Hives       Social History:     Marital Status: /Civil Union   Occupation: unemployed  Patient Pre-hospital Living Situation: lives at home  Patient Pre-hospital Level of Mobility: ambulates with cane  Patient Pre-hospital Diet Restrictions: none  Substance Use History:   History   Alcohol use Not on file     History   Smoking Status    Not on file   Smokeless Tobacco    Not on file     History   Drug use: Unknown       Family History:    History reviewed  No pertinent family history      Physical Exam:     Vitals:   Blood Pressure: 128/59 (10/02/17 0000)  Pulse: 92 (10/02/17 0030)  Temperature: (!) 97 3 °F (36 3 °C) (10/01/17 1842)  Temp Source: Temporal (10/01/17 1842)  Respirations: 22 (10/02/17 0030)  Weight - Scale: 129 kg (283 lb 8 2 oz) (10/01/17 1839)  SpO2: 96 % (10/02/17 0030)    Physical Exam   Constitutional: She is oriented to person, place, and time  She appears well-developed and well-nourished  HENT:   Head: Normocephalic and atraumatic  Right Ear: External ear normal    Left Ear: External ear normal    Nose: Nose normal    Mouth/Throat: Oropharynx is clear and moist    Eyes: Conjunctivae and EOM are normal  Pupils are equal, round, and reactive to light  Neck: Normal range of motion  Cardiovascular: Normal rate, regular rhythm and normal heart sounds  Pulmonary/Chest: Effort normal and breath sounds normal    Abdominal: Soft  Bowel sounds are normal    Musculoskeletal: Normal range of motion  Neurological: She is alert and oriented to person, place, and time  She has normal reflexes  No cranial nerve deficit  Coordination normal    No notable motor weakness  Subjective complaints of numbness right arm and leg, not following a specific dermatome  Skin: Skin is warm and dry  Psychiatric: She has a normal mood and affect  Her behavior is normal  Judgment and thought content normal    Vitals reviewed  Additional Data:     Lab Results: I have personally reviewed pertinent reports  Results from last 7 days  Lab Units 10/01/17  2116   WBC Thousand/uL 18 65*   HEMOGLOBIN g/dL 12 3   HEMATOCRIT % 38 2   PLATELETS Thousands/uL 403*   NEUTROS PCT % 62   LYMPHS PCT % 20   MONOS PCT % 4   EOS PCT % 13*       Results from last 7 days  Lab Units 10/01/17  2116   SODIUM mmol/L 140   POTASSIUM mmol/L 3 5   CHLORIDE mmol/L 102   CO2 mmol/L 29   BUN mg/dL 16   CREATININE mg/dL 0 89   CALCIUM mg/dL 8 7   TOTAL PROTEIN g/dL 7 1   BILIRUBIN TOTAL mg/dL 0 30   ALK PHOS U/L 75   ALT U/L 19   AST U/L 9   GLUCOSE RANDOM mg/dL 104       Results from last 7 days  Lab Units 10/01/17  2116   INR  0 84*       Imaging: I have personally reviewed pertinent reports        Ct Head Without Contrast    Result Date: 10/1/2017  Narrative: CT BRAIN - WITHOUT CONTRAST INDICATION:  Numbness on the right COMPARISON:  None  TECHNIQUE:  CT examination of the brain was performed  In addition to axial images, coronal reformatted images were created and submitted for interpretation  Radiation dose length product (DLP) for this visit:  1051 mGy-cm   This examination, like all CT scans performed in the [place], was performed utilizing techniques to minimize radiation dose exposure, including the use of iterative reconstruction and automated exposure control  IMAGE QUALITY:  Diagnostic  FINDINGS:  PARENCHYMA:  Decreased attenuation is noted in the supratentorial white matter demonstrating an appearance most consistent with moderate microangiopathic change  There is a fluid attenuation structure in the right subinsular region measuring 1 2 x 1 3 cm  with a smaller similar-appearing structure measuring 6 mm on the left  These likely represent dilated perivascular spaces  No intracranial mass, mass effect or midline shift  No CT signs of acute infarction  There is no parenchymal hemorrhage  VENTRICLES AND EXTRA-AXIAL SPACES:  Normal for patient's age  There is a small calcified focus in the right frontal lobe, likely a calcified meningioma  VISUALIZED ORBITS AND PARANASAL SINUSES:  Orbits appear normal   Mild scattered sinus mucosal thickening is noted  No fluid levels are seen  CALVARIUM AND EXTRACRANIAL SOFT TISSUES:   Normal      Impression: 1  No acute intracranial abnormality  Microangiopathic changes  2   Small calcified extra-axial focus right frontal lobe, likely meningioma  Workstation performed: [id number]       EKG, Pathology, and Other Studies Reviewed on Admission:   · EKG: sinus tachycardia    Allscripts / Epic Records Reviewed: Yes     ** Please Note: This note has been constructed using a voice recognition system   **

## 2017-10-02 NOTE — PLAN OF CARE
Problem: PHYSICAL THERAPY ADULT  Goal: Performs mobility at highest level of function for planned discharge setting  See evaluation for individualized goals  Treatment/Interventions: Functional transfer training, LE strengthening/ROM, Elevations, Therapeutic exercise, Endurance training, Patient/family training, Equipment eval/education, Gait training, Spoke to nursing, OT  Equipment Recommended: Jose Juan ([facility] at this time - pt owns/uses)       See flowsheet documentation for full assessment, interventions and recommendations  Prognosis: Good  Problem List: Decreased strength, Decreased endurance, Impaired balance, Decreased mobility, Impaired sensation, Pain  Assessment: Pt is 49 y o  female seen for PT evaluation on 10/2/2017 s/p admit to [facility] on 10/1/2017 w/ TIA (transient ischemic attack); pt presented to ED w/ c/o numbness of the R side of her neck radiating down her R arm and R LE  PT consulted to assess pt's functional mobility and d/c needs  Order placed for PT eval and tx, w/ up w/ A order  Comorbidities affecting pt's physical performance at time of assessment include: HTN  PTA, pt was independent w/ all functional mobility w/ recent use of SPC for mobility since onset of symptoms ~1-2 wks ago, has 10 KALE, lives w/ spouse and family in 1 level home and 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)  Personal factors affecting pt at time of IE include: ambulating w/ assistive device and stairs to enter home  Please find objective findings from PT assessment regarding body systems outlined above with impairments and limitations including weakness, impaired balance, decreased endurance, gait deviations, pain, decreased activity tolerance, altered sensation and fall risk, as well as mobility assessment (need for SBA/supervision assist w/ all phases of mobility when usually ambulating independently and tolerance to only 75 feet of ambulation)   The following objective measures performed on IE also reveal limitations: Barthel Index: 65/100 and Modified [place]: 3 (moderate disability)  Pt to benefit from continued PT tx to address deficits as defined above and maximize level of functional independent mobility and consistency  From PT/mobility standpoint, recommendation at time of d/c would be OP PT vs  Home PT, pending progress in order to facilitate return to PLOF  Barriers to Discharge: Inaccessible home environment  Barriers to Discharge Comments: PT did not assess pt on elevations at this time  Recommendation: Outpatient PT (anticipate OP PT vs  HHPT)     PT - OK to Discharge: No    See flowsheet documentation for full assessment

## 2017-10-02 NOTE — PLAN OF CARE
Problem: OCCUPATIONAL THERAPY ADULT  Goal: Performs self-care activities at highest level of function for planned discharge setting  See evaluation for individualized goals  Treatment Interventions: ADL retraining, UE strengthening/ROM, Endurance training, Functional transfer training, Energy conservation, Activityengagement, Compensatory technique education          See flowsheet documentation for full assessment, interventions and recommendations  Limitation: Decreased UE strength, Decreased ADL status, Decreased high-level ADLs, Decreased self-care trans  Prognosis: Good  Assessment: Pt is a 49 y o  female seen for OT evaluation s/p admit to [facility] on 10/1/2017 w/ TIA (transient ischemic attack)  Pt admitted to Emergency with numbness and tingling RUE and RLE  Comorbidities affecting pt's functional performance at time of assessment include: HTN and obesity  Personal factors affecting pt at time of IE include:steps to enter environment, difficulty performing ADLS, difficulty performing IADLS , health management  and environment  Prior to admission, pt was living home with spouse and family  She reports being I/BADL, IADLs, and drives  pt has being using a SPC recently since symptoms began  No DME in home at baseline  Upon evaluation: Pt requires supervision with Sanju 2* fatigue and moderate pain in back and shoulder  Pt scoring a 65/100 on the Barthel Index indicating min to moderate deficits in occupational performance  The following deficits impacting occupational performance are: weakness, decreased strength, decreased balance, decreased tolerance and decreased safety awareness  Pt to benefit from continued skilled OT tx while in the hospital to address deficits as defined above and maximize level of functional independence w ADL's and functional mobility   Occupational Performance areas to address include: bathing/shower, dressing, socialization, health maintenance, functional mobility, community mobility, clothing management, cleaning, meal prep, money management, household maintenance, care of children and job performance/volunteering  From OT standpoint, recommendation at time of d/c would be Home with family support

## 2017-10-02 NOTE — SPEECH THERAPY NOTE
Consult received as part of Stroke Pathway process (r/o CVA)  MRI completed this am revealed:  no focal area of diffusion restriction to suggest acute infarct  Severe periventricular and white matter T2 hyperintensities likely due to precocious chronic small vessel ischemic changes  Foci of  hemosiderin deposition in the bilateral thalamus suggest chronic microhemorrhages, May be sequela of  hypertension  Pt passed RN Dysphagia Assessment and Neurology denied s/s aphasia & higher cognitive deficits  I completed screening  Pt reported occasions of "difficulty finding words on occasion   soemtimes when I am tired"  No overt focal s/s aphasia, dysarthria or dysphagia (3ozs water protocol passed c me)    No additional evaluation appears indicated at this time

## 2017-10-02 NOTE — PHYSICAL THERAPY NOTE
Physical Therapy Evaluation    Patient's Name: Lillie Mayfield    Admitting Diagnosis  Numbness [R20 0]    Problem List  Patient Active Problem List   Diagnosis    TIA (transient ischemic attack)    HTN (hypertension)       Past Medical History  Past Medical History:   Diagnosis Date    Hypertension        Past Surgical History  History reviewed  No pertinent surgical history  10/02/17 0742   Note Type   Note type Eval/Treat   Pain Assessment   Pain Assessment 0-10   Pain Score 6   Pain Type Acute pain   Pain Location Back;Leg;Shoulder   Pain Orientation Lower;Right   Pain Radiating Towards pt reporting some muscle spasms this morning   Home Living   Type of [address] One level;Performs ADLs on one level;Stairs to enter with rails  (raised ranch; 10 KALE w/ B HR)   Bathroom Shower/Tub Tub/shower unit   Bathroom Toilet Standard   Bathroom Equipment (no DME at baseline)   [address]  (used SPC at baseline)   Prior Function   Level of [place] Independent with ADLs and functional mobility   Lives With Spouse; Family  (7 total people in home)   Beka Echevarria 31 in the last 6 months (1 fall in April- passed out and fell)   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)   Restrictions/Precautions   Wells Megha Bearing Precautions Per Order No   Braces or Orthoses (none per pt)   Other Precautions Telemetry; Fall Risk;Pain   General   Additional Pertinent History CT head 10/1/17: 1  No acute intracranial abnormality  Microangiopathic changes  2   Small calcified extra-axial focus right frontal lobe, likely meningioma     Family/Caregiver Present No   Cognition   Overall Cognitive Status WFL   Arousal/Participation Alert   Orientation Level Oriented X4   Memory Within functional limits   Following Commands Follows all commands and directions without difficulty   RUE Assessment   RUE Assessment (refer to OT eval for details)   LUE Assessment   LUE Assessment (refer to OT eval for details)   RLE Assessment   RLE Assessment X   Strength RLE   R Hip Flexion 4-/5   R Knee Extension 4+/5   R Ankle Dorsiflexion 4+/5   LLE Assessment   LLE Assessment WFL   Strength LLE   L Hip Flexion 4+/5   L Knee Extension 5/5   L Ankle Dorsiflexion 5/5   Coordination   Movements are Fluid and Coordinated 1   Sensation (pt reporting (+) tingling entire R LE and UE)   Light Touch   RLE Light Touch Impaired   RLE Light Touch Comments light touch intact; (+) pins & needles in R LE   LLE Light Touch Grossly intact   Sharp/Dull   RLE Sharp/Dull Not tested   LLE Sharp/Dull Not tested   Bed Mobility   Rolling R 5  Supervision   Additional items HOB elevated; Increased time required   Supine to Sit 5  Supervision   Additional items Assist x 1;HOB elevated; Bedrails; Increased time required   Sit to Supine 5  Supervision   Additional items Assist x 1;HOB elevated; Increased time required   Transfers   Sit to Stand 5  Supervision   Additional items Assist x 1; Increased time required;Verbal cues  (used [facility] for support)   Stand to Sit 5  Supervision   Additional items Verbal cues   Ambulation/Elevation   Gait pattern Antalgic;Decreased foot clearance; Short stride  (increased knee flexion on R LE during stance)   Gait Assistance 5  Supervision  (SBA for safety)   Additional items Assist x 1;Verbal cues   Assistive Device Straight cane  (used [facility] in R UE)   Distance 75'   Stair Management Assistance Not tested   Balance   Static Sitting Good   Dynamic Sitting Fair +   Static Standing Fair   Dynamic Standing Fair -   Ambulatory Fair -   Endurance Deficit   Endurance Deficit Yes   Activity Tolerance   Activity Tolerance Patient limited by fatigue  (R LE heaviness w/ prolonged amb)   Medical Staff Made Aware pt w/ up w/ A order   Nurse Made Aware Yes, RN Sharran Simmonds verbalized pt appropriate for PT session, made aware of outcomes/recs   Assessment   Prognosis Good Problem List Decreased strength;Decreased endurance; Impaired balance;Decreased mobility; Impaired sensation;Pain   Assessment Pt is 49 y o  female seen for PT evaluation on 10/2/2017 s/p admit to [facility] on 10/1/2017 w/ TIA (transient ischemic attack); pt presented to ED w/ c/o numbness of the R side of her neck radiating down her R arm and R LE  PT consulted to assess pt's functional mobility and d/c needs  Order placed for PT eval and tx, w/ up w/ A order  Comorbidities affecting pt's physical performance at time of assessment include: HTN  PTA, pt was independent w/ all functional mobility w/ recent use of SPC for mobility since onset of symptoms ~1-2 wks ago, has 10 KALE, lives w/ spouse and family in 1 level home and 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)  Personal factors affecting pt at time of IE include: ambulating w/ assistive device and stairs to enter home  Please find objective findings from PT assessment regarding body systems outlined above with impairments and limitations including weakness, impaired balance, decreased endurance, gait deviations, pain, decreased activity tolerance, altered sensation and fall risk, as well as mobility assessment (need for SBA/supervision assist w/ all phases of mobility when usually ambulating independently and tolerance to only 75 feet of ambulation)  The following objective measures performed on IE also reveal limitations: Barthel Index: 65/100 and Modified Neftali: 3 (moderate disability)  Pt to benefit from continued PT tx to address deficits as defined above and maximize level of functional independent mobility and consistency  From PT/mobility standpoint, recommendation at time of d/c would be OP PT vs  Home PT, pending progress in order to facilitate return to PLOF     Barriers to Discharge Inaccessible home environment   Barriers to Discharge Comments PT did not assess pt on elevations at this time   Goals   Patient Goals "to get better"   STG Expiration Date 10/12/17   Short Term Goal #1 In 7-10 days: Increase R LE strength 1/2 grade to facilitate independent mobility, Perform all bed mobility tasks independently to decrease caregiver burden, Perform all transfers independently to improve independence, Ambulate > 150 ft  with least restrictive assistive device independently w/o LOB and w/ normalized gait pattern 100% of the time, Navigate 10 stairs modified independent with unilateral handrail to facilitate return to previous living environment, Increase all balance 1/2 grade to decrease risk for falls and Complete exercise program independently   Treatment Day 0   Plan   Treatment/Interventions Functional transfer training;LE strengthening/ROM; Elevations; Therapeutic exercise; Endurance training;Patient/family training;Equipment eval/education;Gait training;Spoke to nursing;OT   PT Frequency 5x/wk   Recommendation   Recommendation Outpatient PT  (anticipate OP PT vs  HHPT)   Equipment Recommended Cane  (SPC at this time - pt owns/uses)   PT - OK to Discharge No   Additional Comments PT to clear pt on elevations prior to d/c home   Modified [place] Scale   Modified [place] Scale 3   Barthel Index   Feeding 10   Bathing 5   Grooming Score 5   Dressing Score 5   Bladder Score 10   Bowels Score 10   Toilet Use Score 10   Transfers (Bed/Chair) Score 10   Mobility (Level Surface) Score 0   Stairs Score 0   Barthel Index Score 65       Yeimy Dietrich, PT

## 2017-10-02 NOTE — OCCUPATIONAL THERAPY NOTE
Occupational Therapy Evaluation          Patient Name: Christel Ca  KSXKD'G Date: 10/2/2017      10/02/17 5616   Note Type   Note type Eval/Treat   Restrictions/Precautions   Weight Bearing Precautions Per Order No   Braces or Orthoses (none per pt)   Pain Assessment   Pain Assessment 0-10   Pain Score 6   Pain Type Acute pain   Pain Location Back; Shoulder;Leg   Pain Orientation Lower;Right   Home Living   Type of [address] Two level;Performs ADLs on one level; Able to live on main level with bedroom/bathroom  (raised ranch; 10 KALE w/ B HR)   Bathroom Shower/Tub Tub/shower unit   Bathroom Toilet Standard   Bathroom Equipment (no DME at baseline)   [address]  (used SPC at baseline)   Prior Function   Level of [place] Independent with ADLs and functional mobility   Lives With Spouse; Family  (7 total people in home)   Beka Echevarria 31 in the last 6 months (1 fall in April- passed out and fell)   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)   Lifestyle   Autonomy Pt I/BADLs and IADLs d   Reciprocal Relationships supportive family   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    Psychosocial   Psychosocial (WDL) WDL   ADL   Where Assessed Edge of bed   Eating Assistance 7  Independent   Grooming Assistance 7  Independent   UB Bathing Assistance 5  Supervision/Setup   UB Bathing Deficit Increased time to complete   LB Bathing Assistance 5  Supervision/Setup   LB Bathing Deficit Increased time to complete   UB Dressing Assistance 5  Supervision/Setup   UB Dressing Deficit Increased time to complete   LB Dressing Assistance 5  Supervision/Setup   LB Dressing Deficit Increased time to complete   Toileting Assistance  5  Supervision/Setup   Toileting Deficit Increased time to complete   Functional Assistance 5  Supervision/Setup   Functional Deficit Increased time to complete   Bed Mobility   Rolling R 5 Supervision   Additional items HOB elevated; Increased time required   Supine to Sit 5  Supervision   Additional items Assist x 1;HOB elevated; Increased time required   Sit to Supine 5  Supervision   Additional items Assist x 1;HOB elevated; Increased time required   Transfers   Sit to Stand 5  Supervision   Additional items Assist x 1; Increased time required   Stand to Sit 5  Supervision   Additional items Verbal cues   Balance   Static Sitting Good   Dynamic Sitting Fair +   Static Standing Fair   Dynamic Standing Fair -   Ambulatory Fair -   Activity Tolerance   Activity Tolerance Patient limited by fatigue   Nurse Made Aware Yes, RN Dayton Cruz verbalized pt appropriate for OT session, made aware of outcomes/recs   RUE Assessment   RUE Assessment X   RUE Strength   RUE Overall Strength Within Functional Limits - able to perform ADL tasks with strength   LUE Assessment   LUE Assessment WFL   Hand Function   Gross Motor Coordination Functional   Fine Motor Coordination Functional   Sensation   Light Touch No apparent deficits   Additional Comments however c/o of Numbness and tingling in RUE   Proprioception   Proprioception No apparent deficits   Vision-Basic Assessment   Current Vision Wears glasses all the time   Patient Visual Report (pt reports blurred vision in L eye yesterday but resolved  )   Cognition   Overall Cognitive Status WFL   Orientation Level Oriented X4   Memory Within functional limits   Following Commands Follows all commands and directions without difficulty   Assessment   Limitation Decreased UE strength;Decreased ADL status; Decreased high-level ADLs; Decreased self-care trans   Prognosis Good   Assessment Pt is a 49 y o  female seen for OT evaluation [facility]/ [facility] (transient ischemic attack)  Pt admitted to Emergency with numbness and tingling RUE and RLE  Comorbidities affecting pt's functional performance at time of assessment include: HTN and obesity   Personal factors affecting pt at time of IE include:steps to enter environment, difficulty performing ADLS, difficulty performing IADLS , health management  and environment  Prior to admission, pt was living home with spouse and family  She reports being I/BADL, IADLs, and drives  pt has being using a SPC recently since symptoms began  No DME in home at baseline  Upon evaluation: Pt requires supervision with Sanju 2* fatigue and moderate pain in back and shoulder  Pt scoring a 65/100 on the Barthel Index indicating min to moderate deficits in occupational performance  The following deficits impacting occupational performance are: weakness, decreased strength, decreased balance, decreased tolerance and decreased safety awareness  Pt to benefit from continued skilled OT tx while in the hospital to address deficits as defined above and maximize level of functional independence w ADL's and functional mobility  Occupational Performance areas to address include: bathing/shower, dressing, socialization, health maintenance, functional mobility, community mobility, clothing management, cleaning, meal prep, money management, household maintenance, care of children and job performance/volunteering  From OT standpoint, recommendation at time of d/c would be Home with family support  Goals   Patient Goals "to get better"   Plan   Treatment Interventions ADL retraining;UE strengthening/ROM; Endurance training;Functional transfer training;Energy conservation; Activityengagement; Compensatory technique education   Goal Expiration Date 10/12/17   OT Frequency 3-5x/wk   Barthel Index   Feeding 10   Bathing 5   Grooming Score 5   Dressing Score 5   Bladder Score 10   Bowels Score 10   Toilet Use Score 10   Transfers (Bed/Chair) Score 10   Mobility (Level Surface) Score 0   Stairs Score 0   Barthel Index Score 65   Modified [place] Scale   Modified [place] Scale 3     Occupational Therapy goals:  In 7-14 days:    1-Patient will increase standing tolerance time to 5  minutes with unilateral UE support to complete sink level ADLs@ mod I level    level  2- Patient will transfer bed to Chair / toilet with I  level with AD as indicated    3- Patient will complete UB ADLs with I   4- Patient will complete LB ADLs with I  5- Patient will complete toileting hygiene withI  6-Patient/ Family  will demonstrate competency with UE Home Exercise Program

## 2017-10-03 ENCOUNTER — APPOINTMENT (OUTPATIENT)
Dept: MRI IMAGING | Facility: HOSPITAL | Age: 47
End: 2017-10-03
Payer: COMMERCIAL

## 2017-10-03 VITALS
RESPIRATION RATE: 18 BRPM | DIASTOLIC BLOOD PRESSURE: 76 MMHG | TEMPERATURE: 97.7 F | HEART RATE: 94 BPM | HEIGHT: 66 IN | OXYGEN SATURATION: 95 % | WEIGHT: 270.06 LBS | BODY MASS INDEX: 43.4 KG/M2 | SYSTOLIC BLOOD PRESSURE: 120 MMHG

## 2017-10-03 PROBLEM — G45.9 TIA (TRANSIENT ISCHEMIC ATTACK): Status: RESOLVED | Noted: 2017-10-02 | Resolved: 2017-10-03

## 2017-10-03 LAB
ANION GAP SERPL CALCULATED.3IONS-SCNC: 9 MMOL/L (ref 4–13)
B BURGDOR IGG SER IA-ACNC: 0.36
B BURGDOR IGM SER IA-ACNC: 0.16
BACTERIA UR QL AUTO: ABNORMAL /HPF
BASOPHILS # BLD AUTO: 0.1 THOUSANDS/ΜL (ref 0–0.1)
BASOPHILS NFR BLD AUTO: 1 % (ref 0–1)
BILIRUB UR QL STRIP: NEGATIVE
BUN SERPL-MCNC: 14 MG/DL (ref 5–25)
CALCIUM SERPL-MCNC: 9.1 MG/DL (ref 8.3–10.1)
CHLORIDE SERPL-SCNC: 104 MMOL/L (ref 100–108)
CLARITY UR: CLEAR
CO2 SERPL-SCNC: 24 MMOL/L (ref 21–32)
COLOR UR: YELLOW
CREAT SERPL-MCNC: 0.69 MG/DL (ref 0.6–1.3)
EOSINOPHIL # BLD AUTO: 2.34 THOUSAND/ΜL (ref 0–0.61)
EOSINOPHIL NFR BLD AUTO: 18 % (ref 0–6)
ERYTHROCYTE [DISTWIDTH] IN BLOOD BY AUTOMATED COUNT: 13.6 % (ref 11.6–15.1)
GFR SERPL CREATININE-BSD FRML MDRD: 103 ML/MIN/1.73SQ M
GLUCOSE P FAST SERPL-MCNC: 102 MG/DL (ref 65–99)
GLUCOSE SERPL-MCNC: 102 MG/DL (ref 65–140)
GLUCOSE UR STRIP-MCNC: NEGATIVE MG/DL
HCT VFR BLD AUTO: 40.3 % (ref 34.8–46.1)
HGB BLD-MCNC: 12.6 G/DL (ref 11.5–15.4)
HGB UR QL STRIP.AUTO: ABNORMAL
KETONES UR STRIP-MCNC: NEGATIVE MG/DL
LEUKOCYTE ESTERASE UR QL STRIP: NEGATIVE
LYMPHOCYTES # BLD AUTO: 2.57 THOUSANDS/ΜL (ref 0.6–4.47)
LYMPHOCYTES NFR BLD AUTO: 20 % (ref 14–44)
MCH RBC QN AUTO: 28.4 PG (ref 26.8–34.3)
MCHC RBC AUTO-ENTMCNC: 31.3 G/DL (ref 31.4–37.4)
MCV RBC AUTO: 91 FL (ref 82–98)
MONOCYTES # BLD AUTO: 0.69 THOUSAND/ΜL (ref 0.17–1.22)
MONOCYTES NFR BLD AUTO: 5 % (ref 4–12)
NEUTROPHILS # BLD AUTO: 6.98 THOUSANDS/ΜL (ref 1.85–7.62)
NEUTS SEG NFR BLD AUTO: 55 % (ref 43–75)
NITRITE UR QL STRIP: NEGATIVE
NON-SQ EPI CELLS URNS QL MICRO: ABNORMAL /HPF
NRBC BLD AUTO-RTO: 0 /100 WBCS
PH UR STRIP.AUTO: 6 [PH] (ref 4.5–8)
PLATELET # BLD AUTO: 428 THOUSANDS/UL (ref 149–390)
PMV BLD AUTO: 9.4 FL (ref 8.9–12.7)
POTASSIUM SERPL-SCNC: 4.5 MMOL/L (ref 3.5–5.3)
PROT UR STRIP-MCNC: NEGATIVE MG/DL
RBC # BLD AUTO: 4.43 MILLION/UL (ref 3.81–5.12)
RBC #/AREA URNS AUTO: ABNORMAL /HPF
SODIUM SERPL-SCNC: 137 MMOL/L (ref 136–145)
SP GR UR STRIP.AUTO: <=1.005 (ref 1–1.03)
UROBILINOGEN UR QL STRIP.AUTO: 0.2 E.U./DL
WBC # BLD AUTO: 12.76 THOUSAND/UL (ref 4.31–10.16)
WBC #/AREA URNS AUTO: ABNORMAL /HPF

## 2017-10-03 PROCEDURE — 81001 URINALYSIS AUTO W/SCOPE: CPT | Performed by: NURSE PRACTITIONER

## 2017-10-03 PROCEDURE — 72141 MRI NECK SPINE W/O DYE: CPT

## 2017-10-03 PROCEDURE — 80048 BASIC METABOLIC PNL TOTAL CA: CPT | Performed by: NURSE PRACTITIONER

## 2017-10-03 PROCEDURE — 85025 COMPLETE CBC W/AUTO DIFF WBC: CPT | Performed by: NURSE PRACTITIONER

## 2017-10-03 RX ADMIN — PANTOPRAZOLE SODIUM 40 MG: 40 TABLET, DELAYED RELEASE ORAL at 05:24

## 2017-10-03 RX ADMIN — LISINOPRIL 5 MG: 5 TABLET ORAL at 09:05

## 2017-10-03 RX ADMIN — ASPIRIN 81 MG: 81 TABLET, COATED ORAL at 09:06

## 2017-10-03 RX ADMIN — DOCUSATE SODIUM 100 MG: 100 CAPSULE, LIQUID FILLED ORAL at 09:06

## 2017-10-03 RX ADMIN — ENOXAPARIN SODIUM 40 MG: 40 INJECTION SUBCUTANEOUS at 09:06

## 2017-10-03 RX ADMIN — ACETAMINOPHEN 650 MG: 325 TABLET ORAL at 13:49

## 2017-10-03 RX ADMIN — HYDROCHLOROTHIAZIDE 25 MG: 25 TABLET ORAL at 09:06

## 2017-10-03 RX ADMIN — ACETAMINOPHEN 650 MG: 325 TABLET ORAL at 05:24

## 2017-10-03 RX ADMIN — VITAMIN D, TAB 1000IU (100/BT) 1000 UNITS: 25 TAB at 09:06

## 2017-10-03 NOTE — DISCHARGE INSTRUCTIONS
Follow-up with Neurology  Therapeutic lifestyle and Weight management advise  Outpatient physical therapy  Chronic Hypertension   WHAT YOU NEED TO KNOW:   Hypertension is high blood pressure (BP)  Your BP is the force of your blood moving against the walls of your arteries  Normal BP is less than 120/80  Prehypertension is between 120/80 and 139/89  Hypertension is 140/90 or higher  Hypertension causes your BP to get so high that your heart has to work much harder than normal  This can damage your heart  Chronic hypertension is a long-term condition that you can control with a healthy lifestyle or medicines  A controlled blood pressure helps protect your organs, such as your heart, lungs, brain, and kidneys  DISCHARGE INSTRUCTIONS:   Call 911 for any of the following:   · You have discomfort in your chest that feels like squeezing, pressure, fullness, or pain  · You become confused or have difficulty speaking  · You suddenly feel lightheaded or have trouble breathing  · You have pain or discomfort in your back, neck, jaw, stomach, or arm  Return to the emergency department if:   · You have a severe headache or vision loss  · You have weakness in an arm or leg  Contact your healthcare provider if:   · You feel faint, dizzy, confused, or drowsy  · You have been taking your BP medicine and your BP is still higher than your healthcare provider says it should be  · You have questions or concerns about your condition or care  Medicines: You may need any of the following:  · Medicine  may be used to help lower your BP  You may need more than one type of medicine  Take the medicine exactly as directed  · Diuretics  help decrease extra fluid that collects in your body  This will help lower your BP  You may urinate more often while you take this medicine  · Cholesterol medicine  helps lower your cholesterol level   A low cholesterol level helps prevent heart disease and makes it easier to control your blood pressure  · Take your medicine as directed  Contact your healthcare provider if you think your medicine is not helping or if you have side effects  Tell him or her if you are allergic to any medicine  Keep a list of the medicines, vitamins, and herbs you take  Include the amounts, and when and why you take them  Bring the list or the pill bottles to follow-up visits  Carry your medicine list with you in case of an emergency  Follow up with your healthcare provider as directed: You will need to return to have your blood pressure checked and to have other lab tests done  Write down your questions so you remember to ask them during your visits  Manage chronic hypertension:  Talk with your healthcare provider about these and other ways to manage hypertension:  · Take your BP at home  Sit and rest for 5 minutes before you take your BP  Extend your arm and support it on a flat surface  Your arm should be at the same level as your heart  Follow the directions that came with your BP monitor  If possible, take at least 2 BP readings each time  Take your BP at least twice a day at the same times each day, such as morning and evening  Keep a record of your BP readings and bring it to your follow-up visits  Ask your healthcare provider what your blood pressure should be  · Limit sodium (salt) as directed  Too much sodium can affect your fluid balance  Check labels to find low-sodium or no-salt-added foods  Some low-sodium foods use potassium salts for flavor  Too much potassium can also cause health problems  Your healthcare provider will tell you how much sodium and potassium are safe for you to have in a day  He or she may recommend that you limit sodium to 2,300 mg a day  · Follow the meal plan recommended by your healthcare provider    A dietitian or your provider can give you more information on low-sodium plans or the DASH (Dietary Approaches to Stop Hypertension) eating plan  The DASH plan is low in sodium, unhealthy fats, and total fat  It is high in potassium, calcium, and fiber  · Exercise to maintain a healthy weight  Exercise at least 30 minutes per day, on most days of the week  This will help decrease your blood pressure  Ask about the best exercise plan for you  · Decrease stress  This may help lower your BP  Learn ways to relax, such as deep breathing or listening to music  · Limit alcohol  Women should limit alcohol to 1 drink a day  Men should limit alcohol to 2 drinks a day  A drink of alcohol is 12 ounces of beer, 5 ounces of wine, or 1½ ounces of liquor  · Do not smoke  Nicotine and other chemicals in cigarettes and cigars can increase your BP and also cause lung damage  Ask your healthcare provider for information if you currently smoke and need help to quit  E-cigarettes or smokeless tobacco still contain nicotine  Talk to your healthcare provider before you use these products  © 2017 [facility] Information is for End User's use only and may not be sold, redistributed or otherwise used for commercial purposes  All illustrations and images included in CareNotes® are the copyrighted property of [employer]  or Ayush Dominguez  The above information is an  only  It is not intended as medical advice for individual conditions or treatments  Talk to your doctor, nurse or pharmacist before following any medical regimen to see if it is safe and effective for you  Weight Management   WHAT YOU NEED TO KNOW:   Being overweight increases your risk of health conditions such as heart disease, high blood pressure, type 2 diabetes, and certain types of cancer  It can also increase your risk for osteoarthritis, sleep apnea, and other respiratory problems  Aim for a slow, steady weight loss  Even a small amount of weight loss can lower your risk of health problems    DISCHARGE INSTRUCTIONS:   How to lose weight safely:  A safe and healthy way to lose weight is to eat fewer calories and get regular exercise  You can lose up about 1 pound a week by decreasing the number of calories you eat by 500 calories each day  You can decrease calories by eating smaller portion sizes or by cutting out high-calorie foods  Read labels to find out how many calories are in the foods you eat  You can also burn calories with exercise such as walking, swimming, or biking  You will be more likely to keep weight off if you make these changes part of your lifestyle  Healthy meal plan for weight management:  A healthy meal plan includes a variety of foods, contains fewer calories, and helps you stay healthy  A healthy meal plan includes the following:  · Eat whole-grain foods more often  A healthy meal plan should contain fiber  Fiber is the part of grains, fruits, and vegetables that is not broken down by your body  Whole-grain foods are healthy and provide extra fiber in your diet  Some examples of whole-grain foods are whole-wheat breads and pastas, oatmeal, brown rice, and bulgur  · Eat a variety of vegetables every day  Include dark, leafy greens such as spinach, kale, amaris greens, and mustard greens  Eat yellow and orange vegetables such as carrots, sweet potatoes, and winter squash  · Eat a variety of fruits every day  Choose fresh or canned fruit (canned in its own juice or light syrup) instead of juice  Fruit juice has very little or no fiber  · Eat low-fat dairy foods  Drink fat-free (skim) milk or 1% milk  Eat fat-free yogurt and low-fat cottage cheese  Try low-fat cheeses such as mozzarella and other reduced-fat cheeses  · Choose meat and other protein foods that are low in fat  Choose beans or other legumes such as split peas or lentils  Choose fish, skinless poultry (chicken or turkey), or lean cuts of red meat (beef or pork)  Before you cook meat or poultry, cut off any visible fat  · Use less fat and oil  Try baking foods instead of frying them  Add less fat, such as margarine, sour cream, regular salad dressing, and mayonnaise to foods  Eat fewer high-fat foods  Some examples of high-fat foods include french fries, doughnuts, ice cream, and cakes  · Eat fewer sweets  Limit foods and drinks that are high in sugar  This includes candy, cookies, regular soda, and sweetened drinks  Ways to decrease calories:   · Eat smaller portions  ¨ Use a small plate with smaller servings  ¨ Do not eat second helpings  ¨ When you eat at a restaurant, ask for a box and place half of your meal in the box before you eat  ¨ Share an entrée with someone else  · Replace high-calorie snacks with healthy, low-calorie snacks  ¨ Choose fresh fruit, vegetables, fat-free rice cakes, or air-popped popcorn instead of potato chips, nuts, or chocolate  ¨ Choose water or calorie-free drinks instead of soda or sweetened drinks  · Eat regular meals  Skipping meals can lead to overeating later in the day  Eat a healthy snack in place of a meal if you do not have time to eat a regular meal      · Do not shop for groceries when you are hungry  You may be more likely to make unhealthy food choices  Take a grocery list of healthy foods and shop after you have eaten  Exercise:  Exercise at least 30 minutes per day on most days of the week  Some examples of exercise include walking, biking, dancing, and swimming  You can also fit in more physical activity by taking the stairs instead of the elevator or parking farther away from stores  Ask your healthcare provider about the best exercise plan for you  Other things to consider as you try to lose weight:   · Be aware of situations that may give you the urge to overeat, such as eating while watching television  Find ways to avoid these situations  For example, read a book, go for a walk, or do crafts      · Meet with a weight loss support group or friends who are also trying to lose weight  This may help you stay motivated to continue working on your weight loss goals  © 2017 [employer] [employer] Information is for End User's use only and may not be sold, redistributed or otherwise used for commercial purposes  All illustrations and images included in CareNotes® are the copyrighted property of [employer]  or Ayush Dominguez  The above information is an  only  It is not intended as medical advice for individual conditions or treatments  Talk to your doctor, nurse or pharmacist before following any medical regimen to see if it is safe and effective for you

## 2017-10-03 NOTE — DISCHARGE SUMMARY
Discharge Summary - [facility] Internal Medicine    Patient Information: Vlad Rubalcava 49 y o  female MRN: [id number]  Unit/Bed#: -01 Encounter: [id number]    Discharging Physician / Practitioner: SUSU Vasquez  PCP: Maite Muñoz MD  Admission Date: 10/1/2017  Discharge Date: 10/03/17    Reason for Admission:  Numbness    Discharge Diagnoses:     Principal Problem (Resolved):    TIA (transient ischemic attack)  Active Problems:    HTN (hypertension)      Consultations During Hospital Stay:  · Neurology  · Dietary  Procedures Performed:     · MRI cervical spine: Straightening with mild reversal the cervical lordosis centered at the C6 segment   Mild spondylosis   No cord compression or cord signal abnormality  · MRI of the brain: No focal area of diffusion restriction to suggest acute infarct, Severe periventricular and white matter T2 hyperintensities likely due to precocious chronic small vessel ischemic changes  Foci of  hemosiderin deposition in the bilateral thalamus suggest chronic microhemorrhages, May be sequela of  hypertension  · CT of the brain: 1   No acute intracranial abnormality   Microangiopathic changes  2   Small calcified extra-axial focus right frontal lobe, likely meningioma  · Chest x-ray:  No acute consolidation or congestion seen  Significant Findings:  ESR 31, WBC 12 7, eosinophils 18  · TIA:  Left arm numbness and paresthesia resolved  She does complain of mild heaviness  · Hypertension:  Continue home regimen  · Obesity:  Weight management, therapeutic lifestyle encourage  · Leukocytosis:  White count on admission 18 6, elevated eosinophils  unknown etiology  Chest x-ray shows no acute pulmonary findings, UA done that was clean  She does have elevated ESR  She would need further outpatient management by PCP      Incidental Findings:     Severe periventricular and white matter T2 hyperintensities likely due to precocious chronic small vessel ischemic changes noted on MRI of the brain  Patient was advised to follow up with Neurology for possible MS workup  Test Results Pending at Discharge (will require follow up): · None     Outpatient Tests Requested:  · Repeat CBC with diff  Complications:  None    Hospital Course:     Robbie Maharaj is a 49 y o  female patient history of TIA, hypertension, asthma, thyroid disease and obese who originally presented to the hospital on 10/1/2017 due to numbness and heaviness of the right side of her neck radiating down to the right side of her arm and the right leg  Patient was seen with previous workup done at [facility] for the same symptoms  An MRI was done this was normal    Neurology was consulted for TIA workup  No evidence of CVA noted on MRI scan  Advised patient to follow up with Neurology for further evaluation will out MS regarding T2 white matter changes  Discharge diagnosis TIA versus possible radiculopathy secondary to diffuse disc bulge at C6 and C7  On discharge patient awake alert oriented x3  The denies numbness or paresthesia  She does report slight heaviness to right arm  She is ambulating in room with a steady gait  Discharge Plan review with patient she verbalized understanding  Patient encouraged to follow up with PCP and follow up with Neurology  Prescription given to repeat CBC with diff in 1 week  Patient also has previous Active strip with [facility], she is encouraged to continue this course of  Treatment  Condition at Discharge: stable     Discharge Day Visit / Exam:     Subjective:  C/C:  I feel better, the numbness is gone, denies paresthesia  There is slight heaviness  Denies chest pain, dizziness, lightheadedness, fever or chills      Vitals: Blood Pressure: 125/70 (10/03/17 1200)  Pulse: 87 (10/03/17 1200)  Temperature: 98 1 °F (36 7 °C) (10/03/17 1200)  Temp Source: Oral (10/03/17 1200)  Respirations: 18 (10/03/17 1200)  Height: 5' 6" (167 6 cm) (10/02/17 1700)  Weight - Scale: 123 kg (270 lb 1 oz) (10/02/17 1700)  SpO2: 93 % (10/03/17 1200)  Exam:   Physical Exam   Constitutional: She is oriented to person, place, and time  She appears well-developed  Obese   HENT:   Head: Normocephalic and atraumatic  Neck: No JVD present  No thyromegaly present  Limited range of motion   Cardiovascular: Normal rate and regular rhythm  No murmur heard  Pulmonary/Chest: Effort normal and breath sounds normal  No respiratory distress  She exhibits no tenderness  Abdominal: Soft  Bowel sounds are normal  There is no tenderness  Grossly obese  Musculoskeletal: She exhibits no deformity  Slight heaviness to right arm, ambulates in room with cane  Neurological: She is alert and oriented to person, place, and time  Skin: Skin is warm and dry  Psychiatric: She has a normal mood and affect  Her behavior is normal  Judgment normal    Nursing note and vitals reviewed  Discharge instructions/Information to patient and family:   See after visit summary for information provided to patient and family  Provisions for Follow-Up Care:  See after visit summary for information related to follow-up care and any pertinent home health orders  Disposition:     Home with outpatient physical therapy  She will continue [facility]  For Discharges to [facility]:   · Not Applicable to this Patient - Not Applicable to this Patient    Planned Readmission: Possible     Discharge Statement:  I spent 38 minutes discharging the patient  This time was spent on the day of discharge  I had direct contact with the patient on the day of discharge  Greater than 50% of the total time was spent examining patient, answering all patient questions, arranging and discussing plan of care with patient as well as directly providing post-discharge instructions  Additional time then spent on discharge activities      Discharge Medications:  See after visit summary for reconciled discharge medications provided to patient and family  ** Please Note: Dragon 360 Dictation voice to text software may have been used in the creation of this document   **

## 2017-10-03 NOTE — PLAN OF CARE
Problem: DISCHARGE PLANNING - CARE MANAGEMENT  Goal: Discharge to post-acute care or home with appropriate resources  INTERVENTIONS:  - Conduct assessment to determine patient/family and health care team treatment goals, and need for post-acute services based on payer coverage, community resources, and patient preferences, and barriers to discharge  - Address psychosocial, clinical, and financial barriers to discharge as identified in assessment in conjunction with the patient/family and health care team  - Arrange appropriate level of post-acute services according to patient's   needs and preference and payer coverage in collaboration with the physician and health care team  - Communicate with and update the patient/family, physician, and health care team regarding progress on the discharge plan  - Arrange appropriate transportation to post-acute venues   Outcome: Progressing  Cm met with pt at bedside  CM explained OBS status  Questions answered  Pt signed OBS  Copy to pt and copy to MR for scanning  Pt lives with her  Negar Gill, children, and grandchildren in a raised ranch home with 10 steps with railing to enter the residence  She takes care of all ADL's  She uses a cane due to difficulty walking  She has used OP/PT through [facility] [address] , but it had to be stopped due to surgery  She has never been in rehab or [facility] services  Denies issues with drugs or alcohol  Denies hx of anxiety or depression  Her PCP is Dr Khai Prater  She uses [facility] and has no problem with her co-pays  She has no POA or Advanced Directive and does not want info at this time  She works watching her grandchildren and she does drive   will transport home upon discharge  CM discussed discharge needs and none were identified      Plan:  Home w/no needs anticipated

## 2017-10-03 NOTE — MALNUTRITION/BMI
This medical record reflects one or more clinical indicators suggestive of malnutrition and/or morbid obesity  Please indicate the one diagnosis below which you feel best reflects the clinical picture  Morbid obesity related to energy intake exceeding energy expenditure over time as evidenced by BMI  BMI Findings:  40-44 9    Body mass index is 43 59 kg/m²  See Nutrition note dated 10/3/17 for additional details  Completed nutrition assessment is viewable in the nutrition documentation

## 2017-10-03 NOTE — PROGRESS NOTES
Progress Note - Neurology   Laverne Arreguin 49 y o  female MRN: [id number]  Unit/Bed#: -01 Encounter: [id number]      Subjective:   Patient without complaints, feels slightly heaviness on the right arm, no weakness no numbness or tingling  No headaches, no dizziness, no focal weakness no other neurological complaints  ROS: 12 system cued query was unchanged from org  consult note  Vitals:   Vitals:    10/03/17 0400   BP: (!) 107/49   Pulse: 78   Resp: 20   Temp: 97 6 °F (36 4 °C)   SpO2: 97%   ,Body mass index is 43 59 kg/m²  MEDS:    aspirin 81 mg Oral Daily   cholecalciferol 1,000 Units Oral Daily   docusate sodium 100 mg Oral BID   enoxaparin 40 mg Subcutaneous Daily   hydrochlorothiazide 25 mg Oral Daily   lisinopril 5 mg Oral Daily   pantoprazole 40 mg Oral Early Morning   pravastatin 20 mg Oral Daily With Dinner   :    Physical Exam:  General appearance: alert, appears stated age and cooperative  Head: Normocephalic, without obvious abnormality, atraumatic, no  spine tenderness  Neurologic:  Mental status: the patient is awake alert and oriented without aphasia or apraxia  CN: Visual fields are full to confrontation  Extraocular movements are full without nystagmus  Pupils are 3 mm and reactive  Face is symmetrical to light touch  Movements of facial expression move symmetrically  Hearing is normal to finger rub bilaterally  Soft palate lifts symmetrically  Shoulder shrug is symmetrical  Tongue is midline without atrophy  Motor: No drift is noted on arm extension  Strength is full in the upper and lower extremities with normal bulk and tone  Coordination: Finger to nose testing is performed accurately  Reflexes: 2/4 and symmetrical in the biceps, triceps, brachial radialis, knee jerk and ankle jerk regions  Toes are downgoing  Lab Results: I have personally reviewed pertinent reports    Imaging Studies: I have personally reviewed pertinent films in PACS with a Radiologist  Assessment:  1  Right-sided numbness currently resolved, no evidence of acute CVA on the MRI scan  Plan:  Official report of MRI C-spine pending, looks like patient has some disc protrusion, carotid ultrasound does not show evidence of any carotid stenosis, she will need to follow up with family physician, regarding lymph nodes I did advise the patient to increase the aspirin to 325 mg, she does not want to do that and would like to continue with 81 mg of aspirin, patient can follow up with Neurology as an outpatient and may benefit from seeing an [facility] [address] specialist for a 2nd opinion regarding T2 white matter changes  Physical therapy occupational therapy and rehab, fall and safety precautions Keep blood pressure cholesterol and sugar under control  Primary team to follow up on the Lyme test and cardiac test results, other management as per primary team   Case discussed with primary team     Counseling / Coordination of Care  Total time spent today 20 minutes  Greater than 50% of total time was spent with the patient and / or family counseling and / or coordination of care  Manuel Ansari MD  10/3/2017,8:47 AM    Dictation voice to text software has been used in the creation of this document  Please consider this in light of any contextual or grammatical errors

## 2017-10-03 NOTE — SOCIAL WORK
Cm met with pt at bedside  CM explained OBS status  Questions answered  Pt signed OBS  Copy to pt and copy to MR for scanning  Pt lives with her  Megan Barrett, children, and grandchildren in a raised ranch home with 10 steps with railing to enter the residence  She takes care of all ADL's  She uses a cane due to difficulty walking  She has used OP/PT through [facility] [address] , but it had to be stopped due to surgery  She has never been in [facility]hab or [facility] services  Denies issues with drugs or alcohol  Denies hx of anxiety or depression  Her PCP is Dr Venkatesh Gonzales  She uses [facility] and has no problem with her co-pays  She has no POA or Advanced Directive and does not want info at this time  She works watching her grandchildren and she does drive   will transport home upon discharge  CM discussed discharge needs and none were identified      Plan:  Home w/no needs anticipated

## 2017-10-03 NOTE — SOCIAL WORK
CM met with pt at bedside  She is to be discharged home w/no needs  Daughter-in-law Amor Blanton will transport home

## 2017-10-03 NOTE — CONSULTS
PHYSICAL MEDICINE AND REHABILITATION CONSULT NOTE  Santo Diaz 49 y o  female MRN: [id number]  Unit/Bed#: -01 Encounter: [id number]    Requested by (Physician/Service): Tarah Munoz MD  Reason for Consultation:  Assessment of rehabilitation needs  Chief Complaint: right-sided numbness    History of Present Illness:  Santo Diaz is a 49 y o  female who  has a past medical history of Asthma; Hypertension; and TIA (transient ischemic attack)  and presented to the [address] [place] [address] with right arm and leg numbness and difficulty ambulating    Previous workup at [address] [place] [address] was negative for stroke  Neurology was consulted  MRI was negative for acute stroke  Restrictions include:  bilateral lower extremities weight bearing as tolerated    Hospital Course/Comorbidities:   Comorbidities:as above      Last Weight:    Wt Readings from Last 1 Encounters:   10/02/17 123 kg (270 lb 1 oz)     Last BMI:  Body mass index is 43 59 kg/m²  Functional History:     Prior to Admission:     Functional Status: Patient was independent with mobility/ambulation, transfers, ADL's, IADL's       Present:  Physical Therapy:  Sit to stand: Supervision,ambulated with supervision       Occupational Therapy:   Supervision with upper body and lower body dressing ,supervision with toileting          Past Medical History:    Past Medical History:   Diagnosis Date    Asthma     Hypertension     TIA (transient ischemic attack)         Past Surgical History:    Past Surgical History:   Procedure Laterality Date    COLON SURGERY      HERNIA REPAIR          Medications:    Current Facility-Administered Medications:     acetaminophen (TYLENOL) tablet 650 mg, 650 mg, Oral, Q4H PRN, Lucila Turner PA-C, 650 mg at 10/03/17 1349    aspirin (ECOTRIN LOW STRENGTH) EC tablet 81 mg, 81 mg, Oral, Daily, Lucila Turner PA-C, 81 mg at 10/03/17 0906    calcium carbonate (TUMS) chewable tablet 1,000 mg, 1,000 mg, Oral, Daily PRN, Earna B[place], PA-C    cholecalciferol (VITAMIN D3) tablet 1,000 Units, 1,000 Units, Oral, Daily, Earna [place], PA-C, 1,000 Units at 10/03/17 0906    docusate sodium (COLACE) capsule 100 mg, 100 mg, Oral, BID, Earna [place], PA-C, 100 mg at 10/03/17 0906    enoxaparin (LOVENOX) subcutaneous injection 40 mg, 40 mg, Subcutaneous, Daily, Earna [place], PA-C, 40 mg at 10/03/17 0906    hydrochlorothiazide (HYDRODIURIL) tablet 25 mg, 25 mg, Oral, Daily, Earna [place], PA-C, 25 mg at 10/03/17 0906    lisinopril (ZESTRIL) tablet 5 mg, 5 mg, Oral, Daily, Earna [place], PA-C, 5 mg at 10/03/17 0905    ondansetron (ZOFRAN) injection 4 mg, 4 mg, Intravenous, Q6H PRN, Earsebas Bakersfield, PA-C    pantoprazole (PROTONIX) EC tablet 40 mg, 40 mg, Oral, Early Morning, Earna [place], PA-C, 40 mg at 10/03/17 0524    pravastatin (PRAVACHOL) tablet 20 mg, 20 mg, Oral, Daily With Dinner, Cayetanosebas Zazueta, PA-C, 20 mg at 10/02/17 2159    Allergies: Allergies   Allergen Reactions    Iodinated Diagnostic Agents Hives    Penicillins Hives        Family History:    History reviewed  No pertinent family history  Social History:    Social History     Social History    Marital status: /Civil Union     Spouse name: N/A    Number of children: N/A    Years of education: N/A     Social History Main Topics    Smoking status: Never Smoker    Smokeless tobacco: Never Used    Alcohol use No    Drug use: No    Sexual activity: Not Asked     Other Topics Concern    None     Social History Narrative    None        Nba Karin lives with her  family in a two-level house with flight of steps to the second floor  Review of systems:10 point review of systems from her admitting physicians history and physical were reviewed, exceptions as per history of present illness      Physical Exam:  /70   Pulse 87   Temp 98 1 °F (36 7 °C) (Oral)   Resp 18   Ht 5' 6" (1 676 m)   Wt 123 kg (270 lb 1 oz)   SpO2 93%   BMI 43 59 kg/m²        Intake/Output Summary (Last 24 hours) at 10/03/17 1508  Last data filed at 10/03/17 0501   Gross per 24 hour   Intake              180 ml   Output             2350 ml   Net            -2170 ml       Body mass index is 43 59 kg/m²  General:obese, not in acute distress  Pulmonary: good air entry bilaterally  Cardiovascular: S1, S2 normal  Abdomen:soft, nontender   Skin/Extremity: no rashes, no erythema, no peripheral edema  Neurologic:cranial nerves II-12 intact, speech is fluent, higher functions intact, reflexes are symmetric bilaterally  Psych: Appropriate affect, alert and oriented to person, place and time   Musculoskeletal - Strength:   Right  Left  Site  Right  Left  Site    5 5  S Ab: Shoulder Abductors  5  5  HF: Hip Flexors    5 5  EF: Elbow Flexors  5/   5  5/   5  H Ab: Hip Abductors/   H Ad: Hip Adductors    5  5  EE: Elbow Extensors  5  5  KF: Knee Flexors    5  5  WE: Wrist Extensors  5  5  KE: Knee Extensors    5  5  FF: Finger Flexors  5  5  DR: Dorsi Flexors    5  5  HI: Hand Intrinsics  5  5  PF: Plantar Flexors          Laboratory:    Lab Results   Component Value Date    HGB 12 6 10/03/2017    HCT 40 3 10/03/2017    WBC 12 76 (H) 10/03/2017      Lab Results   Component Value Date    BUN 14 10/03/2017     10/03/2017    K 4 5 10/03/2017     10/03/2017    GLUCOSE 102 10/03/2017    CREATININE 0 69 10/03/2017      Lab Results   Component Value Date    PROTIME 11 7 (L) 10/01/2017    INR 0 84 (L) 10/01/2017       Imaging:  X-ray Chest 2 Views    Result Date: 10/2/2017  Impression: No acute consolidation or congestion seen Workstation performed: [id number]     Ct Head Without Contrast    Result Date: 10/1/2017  Impression: 1  No acute intracranial abnormality  Microangiopathic changes  2   Small calcified extra-axial focus right frontal lobe, likely meningioma   Workstation performed: [id number]     Mri Brain Wo Contrast    Result Date: 10/2/2017  Impression: No focal area of diffusion restriction to suggest acute infarct Severe periventricular and white matter T2 hyperintensities likely due to precocious chronic small vessel ischemic changes  Foci of  hemosiderin deposition in the bilateral thalamus suggest chronic microhemorrhages, May be sequela of  hypertension ##imslh##imslh Workstation performed: [id number]     Mri Cervical Spine Wo Contrast    Result Date: 10/3/2017  Impression: Straightening with mild reversal the cervical lordosis centered at the C6 segment  Mild spondylosis  No cord compression or cord signal abnormality  Workstation performed: [id number]       Assessment and Recommendations:  Acute onset right-sided numbness, rule out TIA  MRI negative for acute stroke    Impairments:  Impaired functional mobility and ability to perform ADL's      Recommendations:  - Continue PT/OT while inpatient  - Pt is unable to safely return home until she is at the supervision level  - Based upon patient's current functional level, patient can return home when medically stable          Thank you for allowing the PM&R service to participate in the care of this patient  We will continue to follow Xiao Azar progress with you   Please do not hesitate to call with questions or concerns    Sonia Herrera MD   Physical Medicine and Rehabilitation

## 2017-10-03 NOTE — CASE MANAGEMENT
[address] [place] [address] in the [facility] by Ayush Dominguez for 2017  Network Utilization Review Department  Phone: [phone]; Fax [phone]  ATTENTION: The Network Utilization Review Department is now centralized for our 7 Facilities  Make a note that we have a new phone and fax numbers for our Department  Please call with any questions or concerns to [phone] and carefully follow the prompts so that you are directed to the right person  All voicemails are confidential  Fax any determinations, approvals, denials, and requests for initial or continue stay review clinical to [phone]  Due to HIGH CALL volume, it would be easier if you could please send faxed requests to expedite your requests and in part, help us provide discharge notifications faster  Continued Stay Review    Date: 10/3    Vital Signs: /70   Pulse 87   Temp 98 1 °F (36 7 °C) (Oral)   Resp 18   Ht 5' 6" (1 676 m)   Wt 123 kg (270 lb 1 oz)   SpO2 93%   BMI 43 59 kg/m²     Medications:   Scheduled Meds:   aspirin 81 mg Oral Daily   cholecalciferol 1,000 Units Oral Daily   docusate sodium 100 mg Oral BID   enoxaparin 40 mg Subcutaneous Daily   hydrochlorothiazide 25 mg Oral Daily   lisinopril 5 mg Oral Daily   pantoprazole 40 mg Oral Early Morning   pravastatin 20 mg Oral Daily With Dinner     Continuous Infusions:    PRN Meds:   acetaminophen    calcium carbonate    ondansetron    Abnormal Labs/Diagnostic Results: gluc  102, wbc 12 76, plt ct  428    Age/Sex: 49 y o  female     Assessment/Plan:     Discharge Plan: DC written 10/3    Neuro progress note 10/3  Assessment:  1   Right-sided numbness currently resolved, no evidence of acute CVA on the MRI scan      Plan:  Official report of MRI C-spine pending, looks like patient has some disc protrusion, carotid ultrasound does not show evidence of any carotid stenosis, she will need to follow up with family physician, regarding lymph nodes I did advise the patient to increase the aspirin to 325 mg, she does not want to do that and would like to continue with 81 mg of aspirin, patient can follow up with Neurology as an outpatient and may benefit from seeing an [facility] [address] specialist for a 2nd opinion regarding T2 white matter changes  Physical therapy occupational therapy and rehab, fall and safety precautions Keep blood pressure cholesterol and sugar under control    Primary team to follow up on the Lyme test and cardiac test results, other management as per primary team   Case discussed with primary team

## 2017-10-08 ENCOUNTER — HOSPITAL ENCOUNTER (EMERGENCY)
Facility: HOSPITAL | Age: 47
Discharge: HOME | End: 2017-10-08
Attending: EMERGENCY MEDICINE | Admitting: EMERGENCY MEDICINE
Payer: COMMERCIAL

## 2017-10-08 VITALS
TEMPERATURE: 98.7 F | OXYGEN SATURATION: 97 % | RESPIRATION RATE: 18 BRPM | SYSTOLIC BLOOD PRESSURE: 193 MMHG | HEART RATE: 87 BPM | DIASTOLIC BLOOD PRESSURE: 89 MMHG

## 2017-10-08 DIAGNOSIS — K11.20 PAROTITIS: Primary | ICD-10-CM

## 2017-10-08 PROCEDURE — 99283 EMERGENCY DEPT VISIT LOW MDM: CPT

## 2017-10-08 RX ORDER — CEPHALEXIN 500 MG/1
500 CAPSULE ORAL EVERY 6 HOURS SCHEDULED
Qty: 40 CAPSULE | Refills: 0 | Status: SHIPPED | OUTPATIENT
Start: 2017-10-08 | End: 2017-10-18

## 2017-10-08 RX ORDER — CEPHALEXIN 250 MG/1
500 CAPSULE ORAL ONCE
Status: COMPLETED | OUTPATIENT
Start: 2017-10-08 | End: 2017-10-08

## 2017-10-08 RX ADMIN — CEPHALEXIN 500 MG: 250 CAPSULE ORAL at 12:09

## 2017-10-08 NOTE — ED PROVIDER NOTES
History  Chief Complaint   Patient presents with    Facial Pain     Pt reports right sided facial pain and swelling since last night  Pt also reports dizziness, right ear pain  HPI patient is a 50-year-old female, history of previous TIA, presents to the emergency department complaining of right facial pain  Patient reports she awoke with tenderness in her right cheek area  She describes it as a soreness pain with palpation and with touching the area  She complains of a swelling below the tragus of her ear above the angle of the jaw and about to mid cheek on the right side  She reports her face feels swollen there  She denies any direct trauma  She denies any injury  She reports she woke this way and was concerned so came to the hospital   She denies any fever  There is no swelling under the tongue  She denies any difficulty swallowing  Past medical history of TIA , hypertension  Family history noncontributory  Social history, nonsmoker here with her son driving  Prior to Admission Medications   Prescriptions Last Dose Informant Patient Reported?  Taking?   aspirin (ECOTRIN LOW STRENGTH) 81 mg EC tablet   Yes No   Sig: Take 81 mg by mouth daily   cholecalciferol (VITAMIN D3) 1,000 units tablet   Yes No   Sig: Take 1,000 Units by mouth daily   docusate sodium (COLACE) 100 mg capsule   Yes No   Sig: Take 100 mg by mouth 2 (two) times a day   hydrochlorothiazide (HYDRODIURIL) 25 mg tablet   Yes No   Sig: Take 25 mg by mouth daily   ibuprofen (MOTRIN) 800 mg tablet   Yes No   Sig: Take by mouth every 6 (six) hours as needed for mild pain   lisinopril (ZESTRIL) 5 mg tablet   Yes No   Sig: Take 5 mg by mouth daily   lovastatin (MEVACOR) 20 mg tablet   Yes No   Sig: Take 20 mg by mouth daily at bedtime   pantoprazole (PROTONIX) 20 mg tablet   Yes No   Sig: Take 40 mg by mouth daily      Facility-Administered Medications: None       Past Medical History:   Diagnosis Date    Asthma     Hypertension     TIA (transient ischemic attack)        Past Surgical History:   Procedure Laterality Date    COLON SURGERY      HERNIA REPAIR         History reviewed  No pertinent family history  I have reviewed and agree with the history as documented  Social History   Substance Use Topics    Smoking status: Never Smoker    Smokeless tobacco: Never Used    Alcohol use No        Review of Systems   Constitutional: Negative for diaphoresis, fatigue and fever  HENT: Positive for facial swelling  Negative for congestion, ear pain, nosebleeds and sore throat  Eyes: Negative for photophobia, pain, discharge and visual disturbance  Respiratory: Negative for cough, choking, chest tightness, shortness of breath and wheezing  Cardiovascular: Negative for chest pain and palpitations  Gastrointestinal: Negative for abdominal distention, abdominal pain, diarrhea and vomiting  Genitourinary: Negative for dysuria, flank pain and frequency  Musculoskeletal: Negative for back pain, gait problem and joint swelling  Skin: Negative for color change and rash  Neurological: Negative for dizziness, syncope and headaches  Psychiatric/Behavioral: Negative for behavioral problems and confusion  The patient is not nervous/anxious  All other systems reviewed and are negative  Physical Exam  ED Triage Vitals [10/08/17 1144]   Temperature Pulse Respirations Blood Pressure SpO2   98 7 °F (37 1 °C) 87 18 (!) 193/89 97 %      Temp Source Heart Rate Source Patient Position - Orthostatic VS BP Location FiO2 (%)   Temporal Monitor Lying Right arm --      Pain Score       --           Physical Exam   Constitutional: She is oriented to person, place, and time  She appears well-developed and well-nourished  HENT:   Head: Normocephalic     Right Ear: External ear normal    Left Ear: External ear normal    Nose: Nose normal    Mouth/Throat: Oropharynx is clear and moist    Normal ear canals, there is tenderness over the right parotid gland, exquisitely tender and swollen over the parotid down to the angle of the jaw, no swelling under the angle of the jaw, there is tenderness in the mouth in the right cheek consistent with proctitis, no purulent drainage, no sign of obstruction of the gland  Eyes: EOM and lids are normal  Pupils are equal, round, and reactive to light  Neck: Normal range of motion  Neck supple  Cardiovascular: Normal rate and regular rhythm  Pulmonary/Chest: Effort normal  No respiratory distress  Musculoskeletal: Normal range of motion  She exhibits no deformity  Neurological: She is alert and oriented to person, place, and time  Skin: Skin is warm and dry  Psychiatric: She has a normal mood and affect  Nursing note and vitals reviewed  Pulse oximetry 97% on room air adequate oxygenation, no hypoxia    ED Medications  Medications   cephalexin (KEFLEX) capsule 500 mg (500 mg Oral Given 10/8/17 1209)       Diagnostic Studies  Labs Reviewed - No data to display    No orders to display       Procedures  Procedures      Phone Contacts  ED Phone Contact    ED Course  ED Course                                MDM 16-year-old female presents with right facial swelling, tenderness over the right parotid gland consistent with parotidis, possible stone, discussed with her length, discussed ENT follow-up discussed antibiotics, patient reports she can take Keflex, discussed outpatient treatment and follow-up discussed indications to return  CritCare Time    Disposition  Final diagnoses:   Parotitis     ED Disposition     ED Disposition Condition Comment    Discharge  Melina Mcmillan discharge to home/self care  Condition at discharge: Good        Follow-up Information     Follow up With Specialties Details Why Contact Info    Miah Rendon MD Otolaryngology   [address] [facility]      Bernardo Mae MD Otolaryngology   [facility], [address] [place] [id number]  [phone]          Discharge Medication List as of 10/8/2017 11:52 AM      START taking these medications    Details   cephalexin (KEFLEX) 500 mg capsule Take 1 capsule by mouth every 6 (six) hours for 10 days, Starting Sun 10/8/2017, Until Wed 10/18/2017, Normal         CONTINUE these medications which have NOT CHANGED    Details   aspirin (ECOTRIN LOW STRENGTH) 81 mg EC tablet Take 81 mg by mouth daily, Historical Med      cholecalciferol (VITAMIN D3) 1,000 units tablet Take 1,000 Units by mouth daily, Historical Med      docusate sodium (COLACE) 100 mg capsule Take 100 mg by mouth 2 (two) times a day, Historical Med      hydrochlorothiazide (HYDRODIURIL) 25 mg tablet Take 25 mg by mouth daily, Historical Med      ibuprofen (MOTRIN) 800 mg tablet Take by mouth every 6 (six) hours as needed for mild pain, Historical Med      lisinopril (ZESTRIL) 5 mg tablet Take 5 mg by mouth daily, Historical Med      lovastatin (MEVACOR) 20 mg tablet Take 20 mg by mouth daily at bedtime, Historical Med      pantoprazole (PROTONIX) 20 mg tablet Take 40 mg by mouth daily, Historical Med           No discharge procedures on file      ED Provider  Electronically Signed by       Phan Hernandez MD  10/08/17 6933

## 2017-10-08 NOTE — ED NOTES
Pt was seen, assessed and d/c by provider prior to RN assessment        Julisa Quesada RN  10/08/17 7903

## 2017-10-08 NOTE — DISCHARGE INSTRUCTIONS
Keflex every 6 hours to fight infection  Suck on hard candy to stimulate the gland  Increase liquids  Follow up with Otolaryngology      Sialoadenitis   WHAT YOU NEED TO KNOW:   Sialoadenitis is an inflammation or infection of one or more of your salivary glands  A small stone can block the salivary gland and cause inflammation  Infection may be caused by a virus or bacteria  You can develop sialoadenitis on one or both sides of your face  You may have sialoadenitis once, or it may come back and last a long time  DISCHARGE INSTRUCTIONS:   Manage your sialoadenitis:  It is important to manage your sialoadenitis to help prevent future infections  · Drinking liquids:  Adults should drink about 9 to 13 cups of liquid each day  One cup is 8 ounces  Good choices of liquids for most people include water, juice, and milk  Coffee, soup, and fruit may be counted in your daily liquid amount  Ask your caregiver how much liquid you should drink each day  · Keep your mouth moist:  Suck on hard candy or chew sugarless gum to get your saliva flowing  Sour and tart flavors such as lemon and orange will help get saliva to flow  This will help keep your mouth moist and help push out a stone blocking your salivary duct  · Rinse your mouth:  Use water or mouthwash to clean out pus that may be draining into your mouth  · Massage your jaw:  Massage the area of your swollen gland  This may help relieve swelling and pain by pushing the pus out of the gland  · Apply heat:  Place a warm, moist cloth on the area  Medicines:   · NSAIDs  help decrease swelling and pain or fever  This medicine is available with or without a doctor's order  NSAIDs can cause stomach bleeding or kidney problems in certain people  If you take blood thinner medicine, always ask your healthcare provider if NSAIDs are safe for you  Always read the medicine label and follow directions  · Do not give aspirin to children under 25years of age    Your child could develop Reye syndrome if he takes aspirin  Reye syndrome can cause life-threatening brain and liver damage  Check your child's medicine labels for aspirin, salicylates, or oil of wintergreen  · Pain medicine: You may be given medicine to take away or decrease pain  Do not wait until the pain is severe before you take your medicine  · Antibiotics: This medicine will help fight an infection  You will be given antibiotics if your sialoadenitis is caused by a bacterial infection  Take your antibiotics until they are gone, even if you feel better  · Take your medicine as directed  Contact your healthcare provider if you think your medicine is not helping or if you have side effects  Tell him of her if you are allergic to any medicine  Keep a list of the medicines, vitamins, and herbs you take  Include the amounts, and when and why you take them  Bring the list or the pill bottles to follow-up visits  Carry your medicine list with you in case of an emergency  Follow up with your healthcare provider or ear, nose, and throat specialist as directed:  Write down your questions so you remember to ask them during your visits  Contact your healthcare provider or ear, nose, and throat specialist if:   · The pain and swelling do not go away within 2 days, or they get worse  · Your mouth and eyes are very dry  · You lose movement on one side of your face  · You have questions about your condition or care  Return to the emergency department if:   · You have a fever  · Your salivary gland gets red and hot or drains pus  · You have trouble opening your mouth because of swelling  · You have trouble breathing or swallowing because of swelling  © 2017 [facility] Information is for End User's use only and may not be sold, redistributed or otherwise used for commercial purposes   All illustrations and images included in CareNotes® are the copyrighted property of MCKINLEY ULLOA Sobeida  or Ayush Dominguez  The above information is an  only  It is not intended as medical advice for individual conditions or treatments  Talk to your doctor, nurse or pharmacist before following any medical regimen to see if it is safe and effective for you

## 2017-10-31 ENCOUNTER — ALLSCRIPTS OFFICE VISIT (OUTPATIENT)
Dept: OTHER | Facility: OTHER | Age: 47
End: 2017-10-31

## 2017-11-01 NOTE — PROGRESS NOTES
Assessment  1  Numbness (782 0) (R20 0)    Plan  Numbness    · 1 Nellie Maya MD, [facility] Co[facility]Management  *  Status: Active  Requested  for: 25ZSJ7631   Ordered; For: Numbness; Ordered By: Lucia Fong Performed:  Due: 25BAJ6744  Care Summary provided  : Yes   · Call 911 if: You have any symptoms of a stroke ; Status:Complete;   Done: [id number]   Ordered;For:Numbness; Ordered By:Wendy Jacobs;   · Follow-up visit in 2 months Evaluation and Treatment  Follow-up  Status: Complete   Done: 31Oct2017   Ordered; For: Numbness; Ordered By: Lucia Fong Performed:  Due: 02ZDB2246; Last Updated By: Shantelle Chavez; 10/31/2017 2:34:35 PM    Discussion/Summary  Discussion Summary:   Discussed with patient the differential diagnosis including possible related to her history of TIA, her workup has been negative, other possibilities include demyelinating disorder which is less likely, would recommend a 2nd opinion with an MS specialist to make sure that this is not demyelinating disorder, also she was advised to continue follow up with her orthopedic spine surgeon, continue with aspirin, keep blood pressure cholesterol and sugar under control, stroke education given to the patient, go to the hospital if has any worsening symptoms and call me, otherwise to see me back in 2 months and follow up with her other physicians  Counseling Documentation With Imm: The was counseled regarding diagnostic results,-- risk factor reductions,-- prognosis,-- patient and family education,-- risks and benefits of treatment options,-- importance of compliance with treatment  Medication SE Review and Pt Understands Tx: Possible side effects of new medications were reviewed with the patient/guardian today  The treatment plan was reviewed with the patient/guardian   The patient/guardian understands and agrees with the treatment plan      Chief Complaint  Chief Complaint Free Text Note Form: The Patient returns today following up after hospitalization for right sided numbness  She had an inpatient consult with Dr Jacob Peres on 10-2-17  Since last seen she notes the numbness remains  She is also following with Dr Poornima Berry of [place] [address]  and is attending PT 2x a week  History of Present Illness  HPI: Patient is here for evaluation of right-sided numbness, according to the patient she has had the symptoms since 2011 when she is diagnosed with a TIA, the who recently she has been having worsening of the symptoms, she was admitted to [address] [place] [address] and had a normal MRI scan of the brain and was told that has a possible TIA, she also had similar symptoms and was admitted to [facility] and had an MRI scan of the brain that did not show evidence of any acute stroke, carotid ultrasound was unremarkable, she did have T2 white matter changes with the differential diagnosis of microvascular ischemic changes versus demyelinating disorder, she also has had an MRI of the C-spine that shows mild reversal of the cervical lordosis centered at the C6 segment,, she is in follow up with Eduardo Jackson [address] Specialist at [place] [address]  and is in physical therapy, she denies having any pain in the arms but he does have right-sided numbness extending from the neck all the way up to the right knee, no involvement of the face, no symptoms on the left side, no focal weakness, no bowel and bladder incontinence, no headache, no vision or speech difficulty, no issues with her balance, sometime she may have some difficulty with short-term memory, no other neurological complaints  Review of Systems  Neurological ROS:   Constitutional: no fever-- and-- fatigue  HEENT: no eye pain,-- no tinnitus-- and-- no dysphagia  Cardiovascular: no chest pain or pressure,-- no palpitations present-- and-- the heart rate was not rapid or irregular  Respiratory: no shortness of breath with or without exertion     Gastrointestinal: abdominal pain-- and-- hx of hernia, but-- no nausea-- and-- no vomiting  Genitourinary:  no incontinence, no feelings of urinary urgency, no increase in frequency, no urinary hesitancy, no dysuria, no hematuria  Musculoskeletal: head/neck/back pain, but-- no arthralgias  Integumentary no rash:-- and-- no skin lesions  Psychiatric: no mood swings-- and-- no sleep problems  Endocrine no unusual weight loss or gain  Hematologic/Lymphatic: no unusual bleeding-- and-- no tendency for easy bruising  Neurological General: waking up at night-- and-- awakens due to pain, but-- no headache,-- no lightheadedness-- and-- no trouble falling asleep  Neurological Mental Status: difficulty expressing/understanding speech-- and-- memory problems  Neurological Cranial Nerves: vertigo or dizziness, but-- no blurry or double vision-- and-- no facial numbness or weakness  Neurological Motor findings include: tremor,-- twitching-- and-- right leg and foot  Neurological Sensory: heaviness of right side of body, but-- no numbness-- and-- no tingling  Neurological Gait: difficulty walking, but-- has not had falls  Active Problems  1  Hypertension (401 9) (I10)   2  Right facial pain (784 0) (R51)    Past Medical History  1  History of transient cerebral ischemia (V12 54) (Z86 73)    Surgical History  1  History of  Section   2  History of Cholecystectomy   3  History of Colon Surgery   4  History of Colostomy   5  History of Hernia Repair   6  History of Incisional Hernia Repair   7  History of Oophorectomy Unilateral Right Side   8  History of Partial Colectomy With Colostomy    Family History  Mother    1  Family history of Wegener's granulomatosis (V18 3) (Z83 2)   2  Family history of Inappropriate sinus tachycardia  Father    3  Family history of cerebrovascular accident (CVA) (V17 1) (Z82 3)   4  Family history of diabetes mellitus (V18 0) (Z83 3)   5  Family history of hypertension (V17 49) (Z82 49)  Sister    10  Family history of asthma (V17 5) (Z82 5)   7  Family history of rheumatoid arthritis (V17 7) (Z82 61)  Brother    8  Family history of cardiac pacemaker (V17 49) (Z80 80)    Social History   ·    · Never a smoker   · No alcohol use   · Two children   · Unemployed (V62 0) (Z56 0)    Current Meds   1  Aspirin 81 MG TABS; Take 1 tablet daily; Therapy: (Recorded:19Oct2017) to Recorded   2  HydroCHLOROthiazide 25 MG Oral Tablet; Take 1 tablet daily; Therapy: (Recorded:19Oct2017) to Recorded   3  Ibuprofen 800 MG Oral Tablet; TAKE TABLET Daily PRN; Therapy: (Recorded:31Oct2017) to Recorded   4  Lovastatin 20 MG Oral Tablet; TAKE 1 TABLET AT BEDTIME; Therapy: (Recorded:31Oct2017) to Recorded   5  Pantoprazole Sodium 20 MG Oral Tablet Delayed Release; TAKE TABLET Daily PRN; Therapy: (Recorded:31Oct2017) to Recorded   6  Robaxin 500 MG Oral Tablet; TAKE 1 TABLET 3 times daily; Therapy: (Recorded:31Oct2017) to Recorded   7  TraMADol HCl - 50 MG Oral Tablet; TAKE 1 TABLET 3 times daily PRN; Therapy: (Recorded:31Oct2017) to Recorded   8  Vitamin D3 1000 UNIT Oral Capsule; TAKE CAPSULE Daily; Therapy: (Recorded:19Oct2017) to Recorded    Allergies  1  Iodinated Contrast Media   2  Penicillins    Vitals  Signs   Recorded: 56MRS5042 02:06PM   Heart Rate: 90, L Radial  Pulse Quality: Regular, L Radial  Respiration: 20  Systolic: 378, LUE, Sitting  Diastolic: 92, LUE, Sitting  BP Cuff Size: Large  Height: 5 ft 6 in  Weight: 273 lb   BMI Calculated: 44 06  BSA Calculated: 2 28  Pain Scale: 8    Physical Exam    Constitutional   General Appearance: Appears appropriate for age, healthy, well developed, appropriately groomed and appropriately dressed    Eyes   Ophthalmoscopic examination: Vision is grossly normal  Gross visual field testing by confrontation shows no abnormalities  EOMI in both eyes  Conjunctivae clear  Eyelids normal palpebral fissures equal  Orbits exhibit normal position   No discharge from the eyes  PERRL  Cardiovascular   Carotid pulses: Normal, 2+ bilaterally  Peripheral vascular exam: Normal pulses throughout, no tenderness, erythema or swelling  Musculoskeletal   Gait and Station: Walks with normal gait  Tandem walk test is normal  Romberg's test is negative  Muscle strength: Normal strength throughout  Muscle tone: No atrophy, abnormal movements, flaccidity, cogwheeling or spasticity  Range of motion: Normal     Stability: Normal     Inspection of temporomandibular joint appears normal     Neurologic   Orientation to person, place, and time: Normal     Attention span and concentration: Normal thought process and attention span  Language: Names objects, able to repeat phrases and speaks spontaneously  Fund of knowledge: Normal vocabulary with appropriate knowledge of current events and past history  Sensation: Abnormal  -- She feels increased sensation in the right arm and the right leg to light touch compared to the left,  Reflexes: DTR's are normal and symmetric bilaterally  Babinski's reflex is negative bilaterally  No pathologic ankle clonus  Coordination: Cerebellum function intact  No involuntary movement or psychomotor activity  Motor System: No pronator drift  Upper Extremities: Normal to inspection  No tenderness over the upper extremities bilaterally  No instability bilaterally  Strength: Motor strength is 5/5 bilaterally  Normal muscle tone bilaterally  Muscle bulk: Muscle bulk is normal bilaterally  Full ROM bilaterally  Lower Extremities: Normal to inspection and palpation  No tenderness of the lower extremities bilaterally  Exhibits no instability bilaterally  Strength: Motor strength is 5/5 bilaterally  Normal muscle tone bilaterally  Muscle Bulk: Muscle bulk is normal bilaterally  Full ROM bilaterally  Cranial Nerve Exam   II: Normal with no deficit  III,IV, VI: Normal with no deficit  V: Normal with no deficit      VII: Normal with no deficit  VIII: Normal with no deficit  IX: Normal with no deficit  X: Normal with no deficit  XI: Normal with no deficit  XII: Normal with no deficit  Recent and remote memory: Intact  Mood and affect: Normal        Future Appointments    Date/Time Provider Specialty Site   02/14/2018 09:40 AM Alexandra Romeo MD Neurology [facility] [address] [place] [address]   12/21/2017 08:00 AM LAILA Longo   Neurology [address] [place] [address]     Signatures   Electronically signed by : José Luis Simmons MD; Oct 31 2017  2:43PM EST                       (Author)

## 2017-12-21 ENCOUNTER — TRANSCRIBE ORDERS (OUTPATIENT)
Dept: ADMINISTRATIVE | Facility: HOSPITAL | Age: 47
End: 2017-12-21

## 2017-12-21 ENCOUNTER — ALLSCRIPTS OFFICE VISIT (OUTPATIENT)
Dept: OTHER | Facility: OTHER | Age: 47
End: 2017-12-21

## 2017-12-21 DIAGNOSIS — G37.9 DEMYELINATING DISEASE OF CENTRAL NERVOUS SYSTEM (HCC): ICD-10-CM

## 2017-12-21 DIAGNOSIS — G37.9 DEMYELINATING DISEASE OF CENTRAL NERVOUS SYSTEM (HCC): Primary | ICD-10-CM

## 2017-12-22 NOTE — PROGRESS NOTES
Assessment   1  CNS demyelinating disorder (341 9) (G37 9)   2  Cerebrovascular small vessel disease (437 9) (I67 9)   3  Cerebral brain hemorrhage (431) (I61 9)   4  Right hemiparesis (342 90) (G81 91)    Plan   CNS demyelinating disorder    · (1) ANTICARDIOLIPIN ANTIBODY; Status:Active; Requested for:90Bsc4366;    Perform:Merged with [facility]; HMO:[id number]; Ordered; For:CNS demyelinating disorder; Ordered By:Chasidy Banerjee;   · (1) DNA (DS) ANTIBODY; Status:Active; Requested for:65Rzo3381;    Perform:[facility]erged with [facility]; FUR:[id number]; Ordered; For:CNS demyelinating disorder; Ordered By:Chasidy Banerjee;   · (1) LYME ANTIBODY PROFILE W/REFLEX TO WESTERN BLOT; Status:Active; Requested    for:61Sch8755;    Perform:[facility]ed with [facility]; CVN:[id number]; Ordered; For:CNS demyelinating disorder; Ordered By:Chasidy Banerjee;   · (LC) Sedimentation Rate-Westergren; Status:Active; Requested for:85Zmy5754;    Perform:[facility]; MSD:[id number]; Ordered; For:CNS demyelinating disorder; Ordered By:Chasidy Banerjee;   · (LC) Thyroid Peroxidase (TPO) Ab; Status:Active; Requested for:62Ieu4914;    Perform:[facility]; SKT:[id number]; Ordered; For:CNS demyelinating disorder; Ordered By:Chasidy Banerjee;   · (Q) ANCA SCREEN WITH MPO AND PR3 WITH REFLEX TO ANCA TITER; Status:Active; Requested for:21Vzp1678;    Perform:[facility]; LJU:[id number]; Ordered; For:CNS demyelinating disorder; Ordered By:Chasidy Banerjee;   · (Q) RHEUMATOID FACTOR (IGA, IGG, IGM); Status:Active; Requested for:76Vsm1148;    Perform:[facility]; CSA:[id number]; Ordered; For:CNS demyelinating disorder; Ordered By:Chasidy Banerjee;   · (Q) SJOGRENS ANTIBODIES (SS-A,SS-B); Status:Active; Requested for:28Dma2864;    Perform:[facility]; VJR:[id number]; Ordered; For:CNS demyelinating disorder; Ordered By:Chasidy Banerjee;   · (Q) SM/RNP ANTIBODY; Status:Active; Requested for:06Azh3188;    Perform:[facility]; FFF:[id number]; Ordered; For:CNS demyelinating disorder; Ordered By:Chasidy Banerjee;   · * MRI THORACIC SPINE WO CONTRAST; Status:Need Information - Financial    Authorization; Requested for:[id number];    Perform:[facility]; RTK:[id number]; Last Updated By:Issac Blas; 12/21/2017 8:58:28 AM;Ordered; For:CNS demyelinating disorder; Ordered By:Chasidy Banerjee;   · Follow-up visit in 2 months Evaluation and Treatment  Follow-up  Status: Complete     Done: [id number]   Ordered; For: CNS demyelinating disorder; Ordered By: Stephane Morse Performed:  Due: 65SRP4155; Last Updated By: Liam Herman; 12/21/2017 9:00:04 AM    Discussion/Summary   Mrs Cade Dominguez has presented for evaluation of right hemiparesis in the setting of extensive white matter changes in brain parenchyma  Patient stated that she had first time right sided numbness and mild weakness in 2011, and since that time she has been experiencing worsening of her symptoms in the settings of underlying medical conditions  Patient has never had vision loss, no diplopia, no vertigo, no left-sided weakness/paresthesia, no mood swings, no cognitive deterioration  Patient had 4 surgeries over the year with multiple hospitalizations and  Rehabilitations  She has left foot sprain and she is to follow Ortho team right after this encounter  We personally reviewed brain MRI - we agreed that she has extensive white matter changes - mostly due to microangiopathic disease with several microhemorrhages in bilateral thalami, witch would correlated with unilateral sensory disturbances in her face and extremities, likely since 2011  MRI c-spine was negative for demyelination or cord compression/stenosis  We agreed to proceed with MRI T-spine wo contrast to complete imaging studies  We also have blood work for connective tissue disorder with extensive family history of Wegener's granulomatosis, SLE, Sjogren's disease        Based on her findings, and if no clear diagnosis has been made- patient will be recommended to proceed with LP organized by primary team and EMG/NCS RUE/RLE  We reviewed 2DEcho - grade 1 diastolic dysfunction with moderate LVH  Patient has been controlling her pressure in a better way now with 60 lb weight loss described recently  No new focal neurologic deficit noted since her hospital discharge  Counseling Documentation With Imm: Greater than 50% of the 60 minutes evaluation was a face-to-face discussion regarding  the pathophysiology of her current symptoms and further plan, as well as counseling, educating, and coordinating the patientâs care  Chief Complaint   Chief Complaint Free Text Note Form: Patient is here today for a follow up for her numbness      History of Present Illness     Mrs James Mercer is a 58-year-old female with a history of hypertension, asthma, TIA, grade 1 diastolic dysfunction, right-sided kelly paresis, micro hemorrhages in the brain, who presented for evaluation of abnormal MRI and right kelly paresis  As per the patient, she has no new focal neurological deficit since last seen by Dr Geoff Hidalgo  Patient described having right-sided numbness and heaviness since 2011  At that time she had headache and was diagnosed with transient ischemic attack  Similar clinical presentation took place in October 2017 where she has significant fatigue  Patient described significant family history of autoimmune disorders as she her mother has Wegener's granulomatosis, her sister has lupus and rheumatoid arthritis, and no family history of MS  Patient stated that she has no new focal neurological deficit aside from right-sided increase numbness and heaviness    Patient had brain MRI done in hospital which was consistent with advanced white matter changes as well as microhemorrhages in thalami bilaterally         Review of Systems   Neurological ROS:      Constitutional: no fever, no chills, no recent weight gain, no recent weight loss, no complaints of feeling tired, no changes in appetite  HEENT:  no sinus problems, not feeling congested, no blurred vision, no dryness of the eyes, no eye pain, no hearing loss, no tinnitus, no mouth sores, no sore throat, no hoarseness, no dysphagia, no masses, no bleeding  Cardiovascular:  no chest pain or pressure, no palpitations present, the heart rate was not rapid or irregular, no swelling in the arms or legs, no poor circulation  Respiratory:  no unusual or persistant cough, no shortness of breath with or without exertion  Gastrointestinal:  no nausea, no vomiting, no diarrhea, no abdominal pain, no changes in bowel habits, no melena, no loss of bowel control  Genitourinary:  no incontinence, no feelings of urinary urgency, no increase in frequency, no urinary hesitancy, no dysuria, no hematuria  Musculoskeletal: myalgias,-- head/neck/back pain,-- pain while walking-- and-- neck and back pain  Integumentary  no masses, no rash, no skin lesions, no livedo reticularis  Psychiatric:  no anxiety, no depression, no mood swings, no psychiatric hospitalizations, no sleep problems  Endocrine menstrual period change or irregularity  Hematologic/Lymphatic:  no unusual bleeding, no tendency for easy bruising, no clotting skin or lumps  Neurological General:  no headache, no nausea or vomiting, no lightheadedness, no convulsions, no blackouts, no syncope, no trauma, no photopsia, no increased sleepiness, no trouble falling asleep, no snoring, no awakening at night  Neurological Mental Status:  no confusion, no mood swings, no alteration or loss of consciousness, no difficulty expressing/understanding speech, no memory problems  Neurological Cranial Nerves:  no blurry or double vision, no loss of vision, no face drooping, no facial numbness or weakness, no taste or smell loss/changes, no hearing loss or ringing, no vertigo or dizziness, no dysphagia, no slurred speech  Neurological Motor findings include:  no tremor, no twitching, no cramping(pre/post exercise), no atrophy  Neurological Coordination: balance difficulties-- and-- clumsiness  Neurological Sensory:  no numbness, no pain, no tingling, does not fall when eyes closed or taking a shower  Neurological Gait: difficulty walking  ROS Reviewed:    ROS reviewed  Active Problems   1  Back pain (724 5) (M54 9)   2  Diverticulitis (562 11) (K57 92)   3  Hypercholesteremia (272 0) (E78 00)   4  Hypertension (401 9) (I10)   5  Incisional hernia (553 21) (K43 2)   6  Neck pain (723 1) (M54 2)   7  Numbness (782 0) (R20 0)   8  Right facial pain (784 0) (R51)   9  Stomach acid (787 1) (R12)   10  Syncope and collapse (780 2) (R55)    Past Medical History   1  History of transient cerebral ischemia (V12 54) (E30 97)  Active Problems And Past Medical History Reviewed: The active problems and past medical history were reviewed and updated today  Surgical History   1  History of  Section   2  History of Cholecystectomy   3  History of Colon Surgery   4  History of Colostomy   5  History of Hernia Repair   6  History of Incisional Hernia Repair   7  History of Oophorectomy Unilateral Right Side   8  History of Partial Colectomy With Colostomy  Surgical History Reviewed: The surgical history was reviewed and updated today  Family History   Mother    1  Family history of Wegener's granulomatosis (V18 3) (Z83 2)   2  Family history of Inappropriate sinus tachycardia  Father    3  Family history of cerebrovascular accident (CVA) (V17 1) (Z82 3)   4  Family history of diabetes mellitus (V18 0) (Z83 3)   5  Family history of hypertension (V17 49) (Z82 49)  Sister    10  Family history of asthma (V17 5) (Z82 5)   7  Family history of rheumatoid arthritis (V17 7) (Z82 61)  Brother    8  Family history of cardiac pacemaker (V17 49) (Z84 89)  Family History Reviewed:     The family history was reviewed and updated today  Social History    ·    · Never a smoker   · No alcohol use   · Two children   · Unemployed (V62 0) (Z56 0)  Social History Reviewed: The social history was reviewed and updated today  The social history was reviewed and is unchanged  Current Meds    1  Aspirin 81 MG TABS; Take 1 tablet daily; Therapy: (Recorded:19Oct2017) to Recorded   2  HydroCHLOROthiazide 25 MG Oral Tablet; Take 1 tablet daily; Therapy: (Recorded:19Oct2017) to Recorded   3  Ibuprofen 800 MG Oral Tablet; TAKE TABLET Daily PRN; Therapy: (Recorded:31Oct2017) to Recorded   4  Lovastatin 20 MG Oral Tablet; TAKE 1 TABLET AT BEDTIME; Therapy: (Recorded:31Oct2017) to Recorded   5  Pantoprazole Sodium 20 MG Oral Tablet Delayed Release; TAKE TABLET Daily PRN; Therapy: (Recorded:31Oct2017) to Recorded   6  Robaxin 500 MG Oral Tablet; TAKE 1 TABLET 3 times daily; Therapy: (Recorded:31Oct2017) to Recorded   7  TraMADol HCl - 50 MG Oral Tablet; TAKE 1 TABLET 3 times daily PRN; Therapy: (Recorded:31Oct2017) to Recorded   8  Vitamin D3 1000 UNIT Oral Capsule; TAKE CAPSULE Daily; Therapy: (Recorded:19Oct2017) to Recorded    Allergies   1  Iodinated Contrast Media   2  Penicillins    Vitals   Signs   Recorded: 21Dec2017 07:57AM   Heart Rate: 82  Respiration: 19  Systolic: 264  Diastolic: 90  Height: 5 ft 6 in  Weight: 298 lb   BMI Calculated: 48 1  BSA Calculated: 2 37  O2 Saturation: 97    Physical Exam   CONSTITUTIONAL: NAD, pleasant  NECK: supple, no lymphadenopathy, no thyromegaly, no JVD  CARDIOVASCULAR: RRR, normal S1S2, no murmurs, no rubs  RESP: clear to auscultation bilaterally, no wheezes/rhonchi/rales  ABDOMEN: soft, non tender, non distended  SKIN: no rash or skin lesions  EXTREMITIES: no edema, pulses 2+bilaterally  PSYCH: appropriate mood and affect     NEUROLOGIC COMPREHENSIVE EXAM: Patient is oriented to person, place and time, NAD; appropriate affect   CN II, III, IV, V, VI, VII,VIII,IX,X,XI-XII intact with EOMI, PERRLA, OKN intact, VF grossly intact, fundi poorly visualized secondary to pupillary constriction; symmetric face noted  Motor: 5/5 UE/LE bilateral symmetric; Sensory: intact to light touch and pinprick bilaterally; normal vibration sensation feet bilaterally; Coordination within normal limits on FTN and DU testing; DTR: 2/4 through, no Babinski, no clonus  Tandem gait is intact  Romberg: negative  Future Appointments      Date/Time Provider Specialty Site   02/14/2018 09:40 AM Lydia Thomas MD Neurology [id number]   02/16/2018 10:00 AM LAILA Mirza   Neurology [address] [place] [address]     Signatures    Electronically signed by : LAILA Castellanos ; Dec 21 2017  9:58PM EST                       (Author)

## 2018-01-15 ENCOUNTER — HOSPITAL ENCOUNTER (OUTPATIENT)
Dept: MRI IMAGING | Facility: CLINIC | Age: 48
Discharge: HOME | End: 2018-01-15

## 2018-01-15 VITALS
HEART RATE: 90 BPM | RESPIRATION RATE: 20 BRPM | BODY MASS INDEX: 43.87 KG/M2 | HEIGHT: 66 IN | SYSTOLIC BLOOD PRESSURE: 142 MMHG | WEIGHT: 273 LBS | DIASTOLIC BLOOD PRESSURE: 92 MMHG

## 2018-01-15 DIAGNOSIS — G37.9 DEMYELINATING DISEASE OF CENTRAL NERVOUS SYSTEM (HCC): ICD-10-CM

## 2018-01-22 VITALS
WEIGHT: 293 LBS | OXYGEN SATURATION: 97 % | HEIGHT: 66 IN | BODY MASS INDEX: 47.09 KG/M2 | DIASTOLIC BLOOD PRESSURE: 90 MMHG | SYSTOLIC BLOOD PRESSURE: 149 MMHG | HEART RATE: 82 BPM | RESPIRATION RATE: 19 BRPM

## 2018-01-29 ENCOUNTER — TELEPHONE (OUTPATIENT)
Dept: NEUROLOGY | Facility: CLINIC | Age: 48
End: 2018-01-29

## 2018-01-29 NOTE — TELEPHONE ENCOUNTER
Pt called, unable to do mri, could not do due to being clausterphobic   she is asking for medication  rescheduled for 2/19  Please send to pharm

## 2018-02-09 ENCOUNTER — TELEPHONE (OUTPATIENT)
Dept: NEUROLOGY | Facility: CLINIC | Age: 48
End: 2018-02-09

## 2018-02-09 DIAGNOSIS — F40.240 CLAUSTROPHOBIA: Primary | ICD-10-CM

## 2018-02-09 RX ORDER — LORAZEPAM 1 MG/1
1 TABLET ORAL 2 TIMES DAILY
Qty: 2 TABLET | Refills: 0 | Status: SHIPPED | OUTPATIENT
Start: 2018-02-09 | End: 2018-02-14

## 2018-02-09 NOTE — TELEPHONE ENCOUNTER
LMOM to return call  Rx called into [facility]  Per  in [address]  in [address] [place] [address] Rd do not have a printed script

## 2018-02-09 NOTE — TELEPHONE ENCOUNTER
Pt called states that she was scheduled for MRI today and was not able to complete, "it was too small and they have to schedule me that has a bigger equipment"  Pt is scheduled on Tuesday 12/13/18  Pt requesting Lorazepam prior to MRI

## 2018-02-14 ENCOUNTER — OFFICE VISIT (OUTPATIENT)
Dept: NEUROLOGY | Facility: CLINIC | Age: 48
End: 2018-02-14
Payer: COMMERCIAL

## 2018-02-14 VITALS
WEIGHT: 293 LBS | HEART RATE: 75 BPM | BODY MASS INDEX: 48.42 KG/M2 | SYSTOLIC BLOOD PRESSURE: 138 MMHG | DIASTOLIC BLOOD PRESSURE: 88 MMHG

## 2018-02-14 DIAGNOSIS — G37.9 CNS DEMYELINATING DISORDER (HCC): ICD-10-CM

## 2018-02-14 DIAGNOSIS — I67.9 CEREBROVASCULAR SMALL VESSEL DISEASE: Primary | ICD-10-CM

## 2018-02-14 PROCEDURE — 99214 OFFICE O/P EST MOD 30 MIN: CPT | Performed by: PSYCHIATRY & NEUROLOGY

## 2018-02-14 RX ORDER — FERROUS SULFATE 325(65) MG
325 TABLET ORAL 2 TIMES DAILY WITH MEALS
COMMUNITY
End: 2018-06-18 | Stop reason: ALTCHOICE

## 2018-02-14 RX ORDER — TRIAMTERENE AND HYDROCHLOROTHIAZIDE 37.5; 25 MG/1; MG/1
1 CAPSULE ORAL EVERY MORNING
COMMUNITY
End: 2018-06-13 | Stop reason: SDUPTHER

## 2018-02-14 NOTE — PROGRESS NOTES
Asif Dixon is a 49 y o  female  Assessment:  1  Cerebrovascular small vessel disease    2  CNS demyelinating disorder ([facility] [address] )        Plan:    Discussion:  Patient was advised to follow up with her MS specialist for workup of possible MS, we do not have the results of her blood work and the MRI scan of the thoracic spine, we will try to obtain those, she was advised to continue with aspirin, keep her blood pressure cholesterol and sugar under control, go to the hospital if has any worsening symptoms and call me otherwise to see me back in 3-4 months and follow up with her other physicians  Subjective:    HPI  Patient is here in follow-up for her right-sided numbness, since her last visit she has seen the [place] specialist and had MRI scan of the thoracic spine and blood work, patient says her numbness and the weakness on the right side has resolved, she denies having any headaches or dizziness, no bowel and bladder incontinence, no focal weakness, no other neurological complaints  Past Medical History:   Diagnosis Date    Asthma     Hypertension     TIA (transient ischemic attack)        Family History:  History reviewed  No pertinent family history  Past Surgical History:  Past Surgical History:   Procedure Laterality Date    COLON SURGERY      HERNIA REPAIR         Social History:   reports that she has never smoked  She has never used smokeless tobacco  She reports that she does not drink alcohol or use drugs      Allergies:  Iodinated diagnostic agents and Penicillins      Current Outpatient Prescriptions:     aspirin (ECOTRIN LOW STRENGTH) 81 mg EC tablet, Take 81 mg by mouth daily, Disp: , Rfl:     cholecalciferol (VITAMIN D3) 1,000 units tablet, Take 1,000 Units by mouth daily, Disp: , Rfl:     docusate sodium (COLACE) 100 mg capsule, Take 100 mg by mouth 2 (two) times a day, Disp: , Rfl:     ferrous sulfate 325 (65 Fe) mg tablet, Take 325 mg by mouth 2 (two) times a day with meals, Disp: , Rfl:   ibuprofen (MOTRIN) 800 mg tablet, Take by mouth every 6 (six) hours as needed for mild pain, Disp: , Rfl:     lisinopril (ZESTRIL) 5 mg tablet, Take 5 mg by mouth daily, Disp: , Rfl:     lovastatin (MEVACOR) 20 mg tablet, Take 20 mg by mouth daily at bedtime, Disp: , Rfl:     pantoprazole (PROTONIX) 20 mg tablet, Take 40 mg by mouth daily, Disp: , Rfl:     triamterene-hydrochlorothiazide (DYAZIDE) 37 5-25 mg per capsule, Take 1 capsule by mouth every morning, Disp: , Rfl:     [  ]  I have reviewed the past medical history, surgical history, social and family history, current medications, allergies vitals, review of systems, and updated this information as appropriate today  Objective:    Vitals:  Blood pressure 138/88, pulse 75, weight 136 kg (300 lb)  Physical Exam    Neurological Exam    GENERAL:  Cooperative in no acute distress  Well-developed and well-nourished    HEAD and NECK   Head is atraumatic normocephalic with no lesions or masses  Neck is supple with full range of motion    CARDIOVASCULAR  Carotid Arteries-no carotid bruits  NEUROLOGIC:  Mental Status-the patient is awake alert and oriented without aphasia or apraxia  Cranial Nerves: Visual fields are full to confrontation  Extraocular movements are full without nystagmus  Pupils are 2-1/2 mm and reactive  Face is symmetrical to light touch  Movements of facial expression move symmetrically  Hearing is normal to finger rub bilaterally  Soft palate lifts symmetrically  Shoulder shrug is symmetrical  Tongue is midline without atrophy  Motor: No drift is noted on arm extension  Strength is full in the upper and lower extremities with normal bulk and tone  Sensory: Intact to temperature and vibratory sensation in the upper and lower extremities bilaterally  Cortical function is intact  Coordination: Finger to nose testing is performed accurately    Gait reveals a normal base with symmetrical arm swing  Reflexes:  2+ and symmetrical          ROS:    Review of Systems   HENT: Negative  Eyes: Negative  Respiratory: Negative  Cardiovascular: Negative  Gastrointestinal: Negative  Endocrine: Negative  Genitourinary: Negative  Musculoskeletal: Negative  Skin: Negative  Allergic/Immunologic: Positive for environmental allergies  Neurological: Positive for weakness  Hematological: Negative  Psychiatric/Behavioral: Negative

## 2018-02-16 ENCOUNTER — OFFICE VISIT (OUTPATIENT)
Dept: NEUROLOGY | Facility: CLINIC | Age: 48
End: 2018-02-16
Payer: COMMERCIAL

## 2018-02-16 ENCOUNTER — DOCUMENTATION (OUTPATIENT)
Dept: NEUROLOGY | Facility: CLINIC | Age: 48
End: 2018-02-16

## 2018-02-16 VITALS
SYSTOLIC BLOOD PRESSURE: 131 MMHG | DIASTOLIC BLOOD PRESSURE: 84 MMHG | HEIGHT: 66 IN | HEART RATE: 82 BPM | BODY MASS INDEX: 47.09 KG/M2 | WEIGHT: 293 LBS | RESPIRATION RATE: 19 BRPM

## 2018-02-16 DIAGNOSIS — G37.9 CNS DEMYELINATING DISEASE (HCC): Primary | ICD-10-CM

## 2018-02-16 DIAGNOSIS — I10 ESSENTIAL HYPERTENSION: ICD-10-CM

## 2018-02-16 DIAGNOSIS — I61.6 NONTRAUMATIC MULTIPLE LOCALIZED INTRACEREBRAL HEMORRHAGES, UNSPECIFIED LATERALITY (HCC): ICD-10-CM

## 2018-02-16 DIAGNOSIS — R53.1 RIGHT SIDED WEAKNESS: ICD-10-CM

## 2018-02-16 DIAGNOSIS — I51.9 DIASTOLIC DYSFUNCTION, LEFT VENTRICLE: ICD-10-CM

## 2018-02-16 PROCEDURE — 99215 OFFICE O/P EST HI 40 MIN: CPT | Performed by: PSYCHIATRY & NEUROLOGY

## 2018-02-16 RX ORDER — DIPHENOXYLATE HYDROCHLORIDE AND ATROPINE SULFATE 2.5; .025 MG/1; MG/1
1 TABLET ORAL DAILY
COMMUNITY

## 2018-02-16 RX ORDER — LORAZEPAM 1 MG/1
1 TABLET ORAL 2 TIMES DAILY
Qty: 2 TABLET | Refills: 0 | Status: SHIPPED | OUTPATIENT
Start: 2018-02-16 | End: 2018-06-18 | Stop reason: ALTCHOICE

## 2018-02-16 RX ORDER — ASPIRIN 81 MG/1
81 TABLET ORAL DAILY
COMMUNITY

## 2018-02-16 RX ORDER — METHOCARBAMOL 500 MG/1
500 TABLET, FILM COATED ORAL 3 TIMES DAILY PRN
COMMUNITY
End: 2019-03-21 | Stop reason: ALTCHOICE

## 2018-02-16 RX ORDER — LOVASTATIN 20 MG/1
TABLET ORAL
COMMUNITY
Start: 2016-03-20 | End: 2018-06-13 | Stop reason: SDUPTHER

## 2018-02-16 RX ORDER — ACETAMINOPHEN 500 MG
500 TABLET ORAL EVERY 6 HOURS
COMMUNITY
End: 2019-10-02 | Stop reason: ALTCHOICE

## 2018-02-16 RX ORDER — IBUPROFEN 800 MG/1
TABLET ORAL DAILY PRN
COMMUNITY
End: 2018-06-13 | Stop reason: SDUPTHER

## 2018-02-16 RX ORDER — PANTOPRAZOLE SODIUM 20 MG/1
TABLET, DELAYED RELEASE ORAL DAILY PRN
COMMUNITY
End: 2018-06-13 | Stop reason: SDUPTHER

## 2018-02-16 RX ORDER — TRIAMTERENE AND HYDROCHLOROTHIAZIDE 37.5; 25 MG/1; MG/1
1 CAPSULE ORAL EVERY MORNING
COMMUNITY
Start: 2018-01-25

## 2018-02-16 RX ORDER — SACCHAROMYCES BOULARDII 250 MG
1 CAPSULE ORAL
COMMUNITY
End: 2018-10-18 | Stop reason: ALTCHOICE

## 2018-02-16 RX ORDER — PANTOPRAZOLE SODIUM 40 MG/1
40 TABLET, DELAYED RELEASE ORAL DAILY
Refills: 1 | COMMUNITY
Start: 2018-01-11 | End: 2020-05-08 | Stop reason: SDUPTHER

## 2018-02-16 RX ORDER — ASPIRIN 81 MG
TABLET, DELAYED RELEASE (ENTERIC COATED) ORAL
COMMUNITY
End: 2018-06-13 | Stop reason: SDUPTHER

## 2018-02-16 RX ORDER — BIOTIN 1 MG
TABLET ORAL DAILY
COMMUNITY

## 2018-02-16 RX ORDER — FERROUS SULFATE 325(65) MG
TABLET ORAL
COMMUNITY
Start: 2018-01-25 | End: 2018-06-13 | Stop reason: SDUPTHER

## 2018-02-16 NOTE — PROGRESS NOTES
Disc from [address] [place] [address] sent to [facility] radiology to be uploaded onto PACS    2/22/18 - Received disc back from [facility]  It is uploaded onto PACS       I mailed the disc back to the patient today

## 2018-02-16 NOTE — PROGRESS NOTES
Patient is here today for a follow up for her CNS demyelinating disorder    Patient ID: Mami Doctor is a 49 y o  female  Assessment/Plan:   Problem List Items Addressed This Visit     None      Visit Diagnoses     CNS demyelinating disease ([facility] [address] )    -  Primary    Relevant Medications    LORazepam (ATIVAN) 1 mg tablet    Other Relevant Orders    MRI brain MS wo and w contrast    BUN    Creatinine, serum    Nontraumatic multiple localized intracerebral hemorrhages, unspecified laterality (HCC)        Diastolic dysfunction, left ventricle        Essential hypertension        Relevant Medications    triamterene-hydrochlorothiazide (DYAZIDE) 37 5-25 mg per capsule    Right sided weakness        Relevant Orders    MRI brain MS wo and w contrast    EMG 1 Upper/1 Lower Neuropathy         Mrs Yasmani Gallardo presented for follow up on CNS demyelination with no new focal neurologic deficit has been described  Patient had completed MRI T-sine with no demyelination has been appreciated , except several vertebrae with hemangioma  Blood work was negative for RA, SLE, Sjogren's, Lyme disease and Wegener's granulomatosis  Patient previosly was noted to have white matter disease with thalamic micro hemorrhages likely due to poorly controlled hypertension in the past with moderate degree of LVH has been noted on her 2DEcho  We will repeat MRI in March 2018 to see 6 months progression of the disease  At this point CADASIL vs Hypertension related brain changes are high on differential   EMG/NCS will be scheduled for RUE/RLE to evaluate peripheral causes of mild upper and lower extremities weakness  No new focal neurologic deficit appreciated on exam      Greater than 50% of the 40 minutes evaluation was a face-to-face discussion regarding  the pathophysiology of her current symptoms and further plan, as well as counseling, educating, and coordinating the patient's care  HPI/History of Present Illness    Mrs Yasmani Gallardo has a history of hypertension, asthma, TIA, grade 1 diastolic dysfunction, right-sided klely paresis, micro hemorrhages in the brain, who presented to [facility] center for follow up on right sided weakness  Since last office visit, patient described no new focal neurologic deficit, No progression of right sided weakness, no tremors, no visual disturbances  Patient has been working with GI team as she has 2 new small hernias in her intestine, no further surgeries required at this point  The following portions of the patient's history were reviewed and updated as appropriate: allergies, current medications, past family history, past medical history, past social history, past surgical history and problem list          Objective:    Blood pressure 131/84, pulse 82, resp  rate 19, height 5' 6" (1 676 m), weight (!) 137 kg (301 lb)  Physical Exam/Neurological Exam  CONSTITUTIONAL: NAD, pleasant  NECK: supple, no lymphadenopathy, no thyromegaly, no JVD  CARDIOVASCULAR: RRR, normal S1S2, no murmurs, no rubs  RESP: clear to auscultation bilaterally, no wheezes/rhonchi/rales  ABDOMEN: soft, non tender, non distended  SKIN: no rash or skin lesions  EXTREMITIES: no edema, pulses 2+bilaterally  PSYCH: appropriate mood and affect  NEUROLOGIC COMPREHENSIVE EXAM: Patient is oriented to person, place and time, NAD; appropriate affect  CN II, III, IV, V, VI, VII,VIII,IX,X,XI-XII intact with EOMI, PERRLA, OKN intact, VF grossly intact, fundi poorly visualized secondary to pupillary constriction; symmetric face noted  Motor: 5/5 UE/LE bilateral symmetric, 4/5 right shoulder abduction, and elbow flexion; 5-/5 hip flexion on the right and left ; Sensory: intact to light touch and pinprick bilaterally; normal vibration sensation feet bilaterally; Coordination within normal limits on FTN and DU testing; DTR: 2/4 through, no Babinski, no clonus  Tandem gait is intact  Romberg: negative        ROS:  12 points of review of system was reviewed with the patient and was unremarkable with exception: see HPI  Review of Systems   Constitutional: Negative  HENT: Negative  Eyes: Negative  Respiratory: Negative  Cardiovascular: Negative  Gastrointestinal: Negative  Endocrine: Negative  Genitourinary: Negative  Musculoskeletal: Positive for back pain, joint swelling, neck pain and neck stiffness  Skin: Negative  Allergic/Immunologic: Negative  Neurological: Negative  Hematological: Negative  Psychiatric/Behavioral: Negative

## 2018-02-19 ENCOUNTER — TRANSCRIBE ORDERS (OUTPATIENT)
Dept: NEUROLOGY | Facility: HOSPITAL | Age: 48
End: 2018-02-19

## 2018-03-05 ENCOUNTER — DOCUMENTATION (OUTPATIENT)
Dept: NEUROLOGY | Facility: CLINIC | Age: 48
End: 2018-03-05

## 2018-03-05 NOTE — PROGRESS NOTES
Called and spoke to patient to reschedule her EMG   She does not have a car right now and will call us back to reschedule her appointment

## 2018-03-20 ENCOUNTER — PROCEDURE VISIT (OUTPATIENT)
Dept: NEUROLOGY | Facility: CLINIC | Age: 48
End: 2018-03-20
Payer: COMMERCIAL

## 2018-03-20 DIAGNOSIS — G62.9 POLYNEUROPATHY: Primary | ICD-10-CM

## 2018-03-20 PROCEDURE — 95886 MUSC TEST DONE W/N TEST COMP: CPT | Performed by: PHYSICAL MEDICINE & REHABILITATION

## 2018-03-20 PROCEDURE — 95911 NRV CNDJ TEST 9-10 STUDIES: CPT | Performed by: PHYSICAL MEDICINE & REHABILITATION

## 2018-03-20 NOTE — PROGRESS NOTES
The procedure was discussed with the patient  Verbal consent was obtained after discussing risks and benefits  A sterile concentric needle electrode was used  The patient tolerated the procedure well  There were no complications  EMG report: Right upper and lower extremity:     Motor and sensory nerve conduction studies were performed of the median, ulnar, peroneal, tibial and sural nerves  The median, ulnar and tibial compound motor action potentials were normal   The peroneal motor terminal latency was within normal limits with a low compound motor action potential amplitude and a normal conduction velocity distally and across the fibular head  The median, ulnar, peroneal and tibial F waves were normal   The median, ulnar and sural sensory action potentials were normal   The median   palmar evoked response was prolonged by 0 6 milliseconds as compared to the ulnar palmar evoked response at the same distance  The bilateral H waves were normal     Concentric needle examination was performed weakness proximal and distal muscles including  deltoid, biceps, triceps, pronator teres, apb, FDI, low cervical paraspinals,  gluteus medius, vastus lateralis, tibialis anterior, medial gastrocnemius, EDB,low lumbar paraspinals  There was no evidence of active denervation in any of the muscles tested  Mild   to moderate decreased recruitment of giant motor units was noted in the  gluteus medius and EDB  The compound motor unit action potentials were of normal configuration with interference patterns being full or full for effort in the remaining muscles tested  Interpretation:  There is electrophysiologic evidence of a:    1  Mild median nerve compression neuropathy at the wrist with demyelinative changes, consistent with a diagnosis of carpal tunnel syndrome      2   Mild-to-moderate chronic L5 radiculopathy as evidence by the low peroneal motor amplitude and chronic denervation changes in the above-mentioned muscles  3  There is no evidence of a cervical radiculopathy  Clinical correlation is recommended

## 2018-06-18 ENCOUNTER — OFFICE VISIT (OUTPATIENT)
Dept: NEUROLOGY | Facility: CLINIC | Age: 48
End: 2018-06-18
Payer: COMMERCIAL

## 2018-06-18 ENCOUNTER — TELEPHONE (OUTPATIENT)
Dept: NEUROLOGY | Facility: CLINIC | Age: 48
End: 2018-06-18

## 2018-06-18 VITALS
DIASTOLIC BLOOD PRESSURE: 100 MMHG | WEIGHT: 293 LBS | HEIGHT: 65 IN | BODY MASS INDEX: 48.82 KG/M2 | HEART RATE: 80 BPM | SYSTOLIC BLOOD PRESSURE: 170 MMHG

## 2018-06-18 DIAGNOSIS — I67.9 CEREBROVASCULAR SMALL VESSEL DISEASE: Primary | ICD-10-CM

## 2018-06-18 PROCEDURE — 99213 OFFICE O/P EST LOW 20 MIN: CPT | Performed by: PSYCHIATRY & NEUROLOGY

## 2018-06-18 RX ORDER — DEXAMETHASONE 4 MG/1
TABLET ORAL
Refills: 3 | COMMUNITY
Start: 2018-06-10 | End: 2019-03-21 | Stop reason: ALTCHOICE

## 2018-06-18 NOTE — PROGRESS NOTES
Lianna Monahan is a 51 y o  female  Chief Complaint   Patient presents with    Stroke    Numbness     right sided       Assessment:  1  Cerebrovascular small vessel disease        Plan:    Discussion:  Patient was advised to keep a blood pressure cholesterol and sugar under control, she is on an aspirin, she did see the MS specialist regarding possible demyelinating disorder and was advised to have an MRI scan of the brain which patient did not have it and is scheduled to follow up with them in August , to go to the hospital if has any worsening symptoms and call me otherwise to see me back in 4 months and follow up with her other physicians  Subjective:    HPI   Patient is here in follow-up for history of CVA and possible demyelinating disorder, since her last visit she is doing good, she did go for hernia surgery, denying any numbness or tingling sensation, no headache or dizziness, no bowel and bladder incontinence, no focal weakness      Vitals:    06/18/18 1136   BP: 170/100   BP Location: Left arm   Patient Position: Sitting   Cuff Size: Large   Pulse: 80   Weight: (!) 141 kg (311 lb)   Height: 5' 5 25" (1 657 m)       Current Medications    Current Outpatient Prescriptions:     acetaminophen (TYLENOL) 500 mg tablet, Take 500 mg by mouth every 6 (six) hours, Disp: , Rfl:     aspirin (ECOTRIN LOW STRENGTH) 81 mg EC tablet, Take 81 mg by mouth, Disp: , Rfl:     Cholecalciferol (VITAMIN D3) 1000 units CAPS, Take by mouth daily, Disp: , Rfl:     docusate sodium (COLACE) 100 mg capsule, Take 100 mg by mouth 2 (two) times a day, Disp: , Rfl:     ibuprofen (MOTRIN) 800 mg tablet, Take by mouth every 6 (six) hours as needed for mild pain, Disp: , Rfl:     Lactobacillus (ACIDOPHILUS) 0 5 MG TABS, CHEW 1 TABLET BY MOUTH 3 TIMES A DAY, Disp: , Rfl:     lovastatin (MEVACOR) 20 mg tablet, Take 20 mg by mouth daily at bedtime, Disp: , Rfl:     methocarbamol (ROBAXIN) 500 mg tablet, Take 500 mg by mouth 3 (three) times a day as needed  , Disp: , Rfl:     multivitamin (THERAGRAN) TABS, Take 1 tablet by mouth, Disp: , Rfl:     pantoprazole (PROTONIX) 40 mg tablet, Take 40 mg by mouth daily, Disp: , Rfl: 1    triamterene-hydrochlorothiazide (DYAZIDE) 37 5-25 mg per capsule, Take 1 capsule by mouth every morning  , Disp: , Rfl:     VENTOLIN  (90 Base) MCG/ACT inhaler, INHALE 2 PUFFS EVERY 4 (FOUR) HOURS AS NEEDED FOR WHEEZING , Disp: , Rfl: 5    FLOVENT  MCG/ACT inhaler, INHALE 2 PUFFS TWICE A DAY AS NEEDED, Disp: , Rfl: 3    saccharomyces boulardii (FLORASTOR) 250 mg capsule, Take 1 capsule by mouth, Disp: , Rfl:       Allergies  Iodinated diagnostic agents; Metrizamide; Penicillins; Sulfa antibiotics; Sulfamethoxazole-trimethoprim; and Penicillin g    Past Medical History  Past Medical History:   Diagnosis Date    Asthma     Chronic back pain     Hypertension     TIA (transient ischemic attack)          Past Surgical History:  Past Surgical History:   Procedure Laterality Date     SECTION      x2    CHOLECYSTECTOMY      COLON SURGERY      COLOSTOMY      HERNIA REPAIR      1 right inguinal - 3 umbilical    OOPHORECTOMY      SALPINGECTOMY           Family History:  Family History   Problem Relation Age of Onset    Autoimmune disease Mother     Supraventricular tachycardia Mother     Stroke Father     Diabetes Father     Coronary artery disease Father     Hypertension Father        Social History:   reports that she has never smoked  She has never used smokeless tobacco  She reports that she does not drink alcohol or use drugs  I have reviewed the past medical history, surgical history, social and family history, current medications, allergies vitals, review of systems, and updated this information as appropriate today  Objective:    Physical Exam    Neurological Exam    GENERAL:  Cooperative in no acute distress   Well-developed and well-nourished    HEAD and NECK   Head is atraumatic normocephalic with no lesions or masses  Neck is supple with full range of motion    CARDIOVASCULAR  Carotid Arteries-no carotid bruits  NEUROLOGIC:  Mental Status-the patient is awake alert and oriented without aphasia or apraxia  Cranial Nerves: Visual fields are full to confrontation  Funduscopic examination could not be done Extraocular movements are full without nystagmus  Pupils are 2-1/2 mm and reactive  Face is symmetrical to light touch  Movements of facial expression move symmetrically  Hearing is normal to finger rub bilaterally  Soft palate lifts symmetrically  Shoulder shrug is symmetrical  Tongue is midline without atrophy  Motor: No drift is noted on arm extension  Strength is full in the upper and lower extremities with normal bulk and tone  Sensory: Intact to temperature and vibratory sensation in the upper and lower extremities bilaterally  Cortical function is intact  Coordination: Finger to nose testing is performed accurately  Romberg is negative  Gait reveals a normal base with symmetrical arm swing  Reflexes of 1+ and symmetrical          ROS:  Review of Systems   Constitutional: Negative for appetite change and fever  HENT: Negative  Negative for hearing loss, tinnitus, trouble swallowing and voice change  Eyes: Negative  Negative for photophobia, pain and visual disturbance  Respiratory: Negative  Negative for shortness of breath  Cardiovascular: Negative  Negative for palpitations  Gastrointestinal: Positive for abdominal pain  Negative for constipation, diarrhea, nausea and vomiting  Endocrine: Negative  Negative for cold intolerance and heat intolerance  Genitourinary: Negative  Negative for difficulty urinating, dysuria, enuresis, frequency and urgency  Musculoskeletal: Positive for back pain, gait problem and neck pain  Negative for myalgias  Skin: Negative  Negative for rash  Neurological: Positive for weakness   Negative for dizziness, tremors, seizures, syncope, facial asymmetry, speech difficulty, light-headedness, numbness and headaches  Tingling - right sided   Hematological: Negative  Does not bruise/bleed easily  Psychiatric/Behavioral: Negative  Negative for confusion, decreased concentration, hallucinations and sleep disturbance

## 2018-06-18 NOTE — TELEPHONE ENCOUNTER
Patient was recommended to have 6 months brain MRI follow up with initial MRI completed in October 2017 - as per my note , projected time was March 2018

## 2018-06-18 NOTE — TELEPHONE ENCOUNTER
Patient asked at checkout when she should have her MRI done  Mri order states expected date is 2/20/19  Please confirm if you would like her to have MRI prior to her appointment in August 2018 or if she should have it done in February  Thanks!

## 2018-06-28 NOTE — TELEPHONE ENCOUNTER
Called and spoke to patient  She could not schedule at the time of the phone call  She states that she will call the imaging scheduling department back at a later time

## 2018-09-20 ENCOUNTER — TELEPHONE (OUTPATIENT)
Dept: NEUROLOGY | Facility: CLINIC | Age: 48
End: 2018-09-20

## 2018-09-20 NOTE — TELEPHONE ENCOUNTER
Pt returning our call  Pt did not have mri done yet as she had surgery and did not have a chance to   She also states that she is very clausterphobic and has to work herself up to doing it  I spoke to jarod and she will check [facility] as pt has an appt 9/21    [phone] or [phone], call home number first   Will be available at home until 2:30, between 2:30-4 use call, after 4:30 call home number

## 2018-09-21 ENCOUNTER — OFFICE VISIT (OUTPATIENT)
Dept: NEUROLOGY | Facility: CLINIC | Age: 48
End: 2018-09-21
Payer: COMMERCIAL

## 2018-09-21 VITALS
HEIGHT: 65 IN | BODY MASS INDEX: 48.82 KG/M2 | SYSTOLIC BLOOD PRESSURE: 124 MMHG | WEIGHT: 293 LBS | DIASTOLIC BLOOD PRESSURE: 78 MMHG | HEART RATE: 84 BPM

## 2018-09-21 DIAGNOSIS — M54.17 LUMBOSACRAL RADICULOPATHY AT L5: ICD-10-CM

## 2018-09-21 DIAGNOSIS — G37.9 CNS DEMYELINATING DISEASE (HCC): Primary | ICD-10-CM

## 2018-09-21 DIAGNOSIS — I61.6 NONTRAUMATIC MULTIPLE LOCALIZED INTRACEREBRAL HEMORRHAGES, UNSPECIFIED LATERALITY (HCC): ICD-10-CM

## 2018-09-21 DIAGNOSIS — G56.01 CARPAL TUNNEL SYNDROME ON RIGHT: ICD-10-CM

## 2018-09-21 DIAGNOSIS — I67.9 CEREBROVASCULAR SMALL VESSEL DISEASE: ICD-10-CM

## 2018-09-21 DIAGNOSIS — G81.91 RIGHT HEMIPARESIS (HCC): ICD-10-CM

## 2018-09-21 PROCEDURE — 99214 OFFICE O/P EST MOD 30 MIN: CPT | Performed by: PSYCHIATRY & NEUROLOGY

## 2018-09-21 RX ORDER — FERROUS SULFATE 325(65) MG
325 TABLET ORAL
COMMUNITY
End: 2019-03-21 | Stop reason: ALTCHOICE

## 2018-09-21 RX ORDER — DIAZEPAM 10 MG/1
TABLET ORAL
Qty: 3 TABLET | Refills: 0 | Status: SHIPPED | OUTPATIENT
Start: 2018-09-21 | End: 2018-10-18 | Stop reason: ALTCHOICE

## 2018-09-21 NOTE — PROGRESS NOTES
Patient ID: Diamante Escoto is a 51 y o  female  Assessment/Plan:     Problem List Items Addressed This Visit        Cardiovascular and Mediastinum    Cerebrovascular small vessel disease       Nervous and Auditory    CNS demyelinating disease ([address] ) - Primary    Relevant Medications    diazepam (VALIUM) 10 mg tablet    Other Relevant Orders    MRI brain MS wo and w contrast    Right hemiparesis (HCC)    Lumbosacral radiculopathy at L5    Relevant Orders    CT spine lumbar wo contrast    Nontraumatic multiple localized intracerebral hemorrhages ([address] )    Carpal tunnel syndrome on right         Today I had the pleasure of seeing your patient, Diamante Escoto, in consultation at [facility]  Mrs Dominga Mahajan has presented for follow up on CNS demyelination with right hemiparesis  Patient was noted to have right CTS and right L5 radiculopathy, which would contribute to her clinical findings  Patient had completed PT for upper and lower back with CT lumbar area recommended due to recently diagnosed L5 radiculopathy  Open MRI brain will be scheduled in this area with Diazepam 10 mg provided  Prior concern was for CADASIL vs hypertensive emergency with multiple intracranial micro hemorrhages  Patient is happy as no pain is reported  No new focal neurologic deficit appreciated  Patient is to follow with Dr José Miguel Pittman on 10/18/18  Subjective: right had and right weakness    HPI/History of Present Illness  Mrs Dominga Mahajan has a history of hypertension, asthma, TIA, grade 1 diastolic dysfunction, right-sided kelly paresis, with right L5 radiculopathy and right hand CTS, multiple micro hemorrhages in the brain parnchyma, who presented for follow up on right sided weakness   Patient had completed PT, with same right sided weakness is not worse; with better movement in her neck with no pain described  Patient had severe iron deficiency anemia due to internal bleeding die to vessel rupture     Patient described stable clinical presentation since last office visit  The following portions of the patient's history were reviewed and updated as appropriate:   She  has a past medical history of Asthma; Cerebral brain hemorrhage ([facility] ); Cerebrovascular small vessel disease; Chronic back pain; CNS demyelinating disorder ([facility] ); Diverticulitis; Hypercholesteremia; Hypertension; Incisional hernia; Numbness on right side; Right facial pain; Right hemiparesis ([facility] [facility] ); Syncope and collapse; and TIA (transient ischemic attack)  She   Patient Active Problem List    Diagnosis Date Noted    CNS demyelinating disease ([facility] [facility] ) 2018    Right hemiparesis ([facility] ) 2018    Lumbosacral radiculopathy at L5 2018    Nontraumatic multiple localized intracerebral hemorrhages ([facility] ) 2018    Carpal tunnel syndrome on right 2018    Cerebrovascular small vessel disease 2017    HTN (hypertension) 10/02/2017     She  has a past surgical history that includes Colon surgery; Hernia repair; Colostomy; Cholecystectomy; Salpingectomy;  section; and Oophorectomy  Her family history includes Autoimmune disease in her mother; Coronary artery disease in her father; Diabetes in her father; Hypertension in her father; Stroke in her father; Supraventricular tachycardia in her mother  She  reports that she has never smoked  She has never used smokeless tobacco  She reports that she does not drink alcohol or use drugs    Current Outpatient Prescriptions   Medication Sig Dispense Refill    acetaminophen (TYLENOL) 500 mg tablet Take 500 mg by mouth every 6 (six) hours      aspirin (ECOTRIN LOW STRENGTH) 81 mg EC tablet Take 81 mg by mouth      Cholecalciferol (VITAMIN D3) 1000 units CAPS Take by mouth daily      docusate sodium (COLACE) 100 mg capsule Take 100 mg by mouth 2 (two) times a day      ferrous sulfate 325 (65 Fe) mg tablet Take 325 mg by mouth daily with breakfast      FLOVENT  MCG/ACT inhaler INHALE 2 PUFFS TWICE A DAY AS NEEDED  3    ibuprofen (MOTRIN) 800 mg tablet Take by mouth every 6 (six) hours as needed for mild pain      Lactobacillus (ACIDOPHILUS) 0 5 MG TABS CHEW 1 TABLET BY MOUTH 3 TIMES A DAY      lisinopril (ZESTRIL) 5 mg tablet TAKE 1 TABLET BY MOUTH EVERY DAY      lovastatin (MEVACOR) 20 mg tablet Take 30 mg by mouth daily at bedtime        methocarbamol (ROBAXIN) 500 mg tablet Take 500 mg by mouth 3 (three) times a day as needed        multivitamin (THERAGRAN) TABS Take 1 tablet by mouth      pantoprazole (PROTONIX) 40 mg tablet Take 40 mg by mouth daily  1    triamterene-hydrochlorothiazide (DYAZIDE) 37 5-25 mg per capsule Take 1 capsule by mouth every morning        VENTOLIN  (90 Base) MCG/ACT inhaler INHALE 2 PUFFS EVERY 4 (FOUR) HOURS AS NEEDED FOR WHEEZING  5    diazepam (VALIUM) 10 mg tablet Take 1 tab 40 min prior to imaging and then second dose 10 mg prior to MRI 3 tablet 0    naproxen (NAPROSYN) 500 mg tablet Take 500 mg by mouth every 12 (twelve) hours as needed  0    saccharomyces boulardii (FLORASTOR) 250 mg capsule Take 1 capsule by mouth       No current facility-administered medications for this visit        Current Outpatient Prescriptions on File Prior to Visit   Medication Sig    acetaminophen (TYLENOL) 500 mg tablet Take 500 mg by mouth every 6 (six) hours    aspirin (ECOTRIN LOW STRENGTH) 81 mg EC tablet Take 81 mg by mouth    Cholecalciferol (VITAMIN D3) 1000 units CAPS Take by mouth daily    docusate sodium (COLACE) 100 mg capsule Take 100 mg by mouth 2 (two) times a day    FLOVENT  MCG/ACT inhaler INHALE 2 PUFFS TWICE A DAY AS NEEDED    ibuprofen (MOTRIN) 800 mg tablet Take by mouth every 6 (six) hours as needed for mild pain    Lactobacillus (ACIDOPHILUS) 0 5 MG TABS CHEW 1 TABLET BY MOUTH 3 TIMES A DAY    lisinopril (ZESTRIL) 5 mg tablet TAKE 1 TABLET BY MOUTH EVERY DAY    lovastatin (MEVACOR) 20 mg tablet Take 30 mg by mouth daily at bedtime      methocarbamol (ROBAXIN) 500 mg tablet Take 500 mg by mouth 3 (three) times a day as needed      multivitamin (THERAGRAN) TABS Take 1 tablet by mouth    pantoprazole (PROTONIX) 40 mg tablet Take 40 mg by mouth daily    triamterene-hydrochlorothiazide (DYAZIDE) 37 5-25 mg per capsule Take 1 capsule by mouth every morning      VENTOLIN  (90 Base) MCG/ACT inhaler INHALE 2 PUFFS EVERY 4 (FOUR) HOURS AS NEEDED FOR WHEEZING   naproxen (NAPROSYN) 500 mg tablet Take 500 mg by mouth every 12 (twelve) hours as needed    saccharomyces boulardii (FLORASTOR) 250 mg capsule Take 1 capsule by mouth     No current facility-administered medications on file prior to visit  She is allergic to iodinated diagnostic agents; metrizamide; penicillins; sulfa antibiotics; sulfamethoxazole-trimethoprim; and penicillin g          Objective:    Blood pressure 124/78, pulse 84, height 5' 5" (1 651 m), weight (!) 143 kg (316 lb)  Physical Exam/Neurological Exam  CONSTITUTIONAL: NAD, pleasant  NECK: supple, no lymphadenopathy, no thyromegaly, no JVD  CARDIOVASCULAR: RRR, normal S1S2, no murmurs, no rubs  RESP: clear to auscultation bilaterally, no wheezes/rhonchi/rales  ABDOMEN: soft, non tender, non distended  SKIN: no rash or skin lesions  EXTREMITIES: no edema, pulses 2+bilaterally  PSYCH: appropriate mood and affect  NEUROLOGIC COMPREHENSIVE EXAM: Patient is oriented to person, place and time, NAD; appropriate affect  CN II, III, IV, V, VI, VII,VIII,IX,X,XI-XII intact with EOMI, PERRLA, OKN intact, VF grossly intact, fundi poorly visualized secondary to pupillary constriction; symmetric face noted  Motor: 5/5 UE/LE bilateral symmetric, 4/5 right shoulder abduction, and elbow flexion; 5-/5 hip flexion on the right and left ; Sensory: intact to light touch and pinprick bilaterally; normal vibration sensation feet bilaterally;  Coordination within normal limits on FTN and DU testing; DTR: 2/4 through, no Babinski, no clonus  Tandem gait is intact  Romberg: negative  ROS:  12 points of review of system was reviewed with the patient and was unremarkable with exception: see HPI  Review of Systems   Constitutional: Positive for fatigue  Negative for appetite change and fever  HENT: Negative  Negative for hearing loss, tinnitus, trouble swallowing and voice change  Eyes: Negative  Negative for photophobia and pain  Respiratory: Negative  Negative for shortness of breath  Cardiovascular: Negative  Negative for palpitations  Gastrointestinal: Negative  Negative for nausea  Endocrine: Negative  Negative for cold intolerance and heat intolerance  Genitourinary: Negative  Negative for dysuria, frequency and urgency  Musculoskeletal: Positive for back pain, gait problem (balance problems) and neck stiffness  Negative for myalgias and neck pain  Skin: Negative  Allergic/Immunologic: Negative  Neurological: Negative for dizziness, tremors, seizures, syncope, facial asymmetry, speech difficulty, weakness and numbness  Hematological: Negative  Does not bruise/bleed easily  Psychiatric/Behavioral: Negative  Negative for confusion and hallucinations

## 2018-09-24 ENCOUNTER — TELEPHONE (OUTPATIENT)
Dept: NEUROLOGY | Facility: CLINIC | Age: 48
End: 2018-09-24

## 2018-09-24 NOTE — TELEPHONE ENCOUNTER
Pt called asking if we did her PA's for her CT and MRI, she does not have them scheduled yet  Pt made aware that they need to be scheduled prior to obtaining the PA  Pt states she thought that was how to proceed but [facility] wouldn't schedule without the PA in place  She asked that I fax her scripts to [facility] [phone], and she will call back  These were faxed

## 2018-10-09 ENCOUNTER — TELEPHONE (OUTPATIENT)
Dept: NEUROLOGY | Facility: CLINIC | Age: 48
End: 2018-10-09

## 2018-10-09 NOTE — TELEPHONE ENCOUNTER
pt called and states that insurance would not auth CT   she states that Dr DUMONT wanted her to see chiropractor but wanted her to have ct first   i spoke to navjot and   this was denied in peer to peer due to not having PT for 6 weeks in the last 6 months  plus we need to document that she is clausterphobic  we would need PT notes  [facility] before she had sx in may 25  [facility][phone] ext [phone] or [phone]  [facility] and spoke to [facility] records and they state that we would need to submit records request signed by pt   can fax request to [phone]  or [phone]  pt made aware    she will complete [facility] and have [facility] faxed to [phone]

## 2018-10-18 ENCOUNTER — OFFICE VISIT (OUTPATIENT)
Dept: NEUROLOGY | Facility: CLINIC | Age: 48
End: 2018-10-18
Payer: COMMERCIAL

## 2018-10-18 VITALS — HEART RATE: 78 BPM | BODY MASS INDEX: 48.82 KG/M2 | WEIGHT: 293 LBS | HEIGHT: 65 IN

## 2018-10-18 DIAGNOSIS — G56.01 CARPAL TUNNEL SYNDROME ON RIGHT: ICD-10-CM

## 2018-10-18 DIAGNOSIS — I67.9 CEREBROVASCULAR SMALL VESSEL DISEASE: Primary | ICD-10-CM

## 2018-10-18 DIAGNOSIS — G37.9 CNS DEMYELINATING DISEASE (HCC): ICD-10-CM

## 2018-10-18 PROCEDURE — 99214 OFFICE O/P EST MOD 30 MIN: CPT | Performed by: PSYCHIATRY & NEUROLOGY

## 2018-10-18 NOTE — PROGRESS NOTES
Camron Nunn is a 51 y o  female  Chief Complaint   Patient presents with    Stroke     2011       Assessment:  1  Cerebrovascular small vessel disease    2  CNS demyelinating disease ([address] )    3  Carpal tunnel syndrome on right      Plan:    Discussion:  Reviewed and discussed with patient her recent MRI scan of the brain results, she does have some T2 white matter changes that are being reported as chronic demyelinating disorder without any active lesions, no evidence of CVA, patient does have history of TIA in the past and hypertension, she does not have any clinical signs and symptoms of MS, I do not think she needs to be on disease modifying drugs for MS, she is in follow up with Dr Florencio Spangler, I have advised her to keep a blood pressure cholesterol and sugar under control, continue to take an aspirin, she is seeing a chiropractor for her back pain, to go to the hospital if has any worsening symptoms and call me otherwise to see me back in 4 months and follow up with her other physicians  She was also advised to follow up with family physician regarding acute sphenoid sinusitis on the MRI scan  Subjective:    HPI   Patient is here in follow-up for history of hypertension TIA, right-sided hemiparesis, carpal tunnel syndrome and questionable demyelinating disorder, since her last visit she has been doing good, she feels her right-sided weakness is much improved, she has minimal subjective weakness of the right leg, she is in the process of seeing a chiropractor, no bowel and bladder incontinence, her blood pressure this morning at home was 136/78 as reported by the patient, the MA could not record the blood pressure in our office, no other neurological complaints      Vitals:    10/18/18 0859   Pulse: 78   Weight: (!) 145 kg (319 lb)   Height: 5' 4 5" (1 638 m)       Current Medications    Current Outpatient Prescriptions:     acetaminophen (TYLENOL) 500 mg tablet, Take 500 mg by mouth every 6 (six) hours, Disp: , Rfl:     aspirin (ECOTRIN LOW STRENGTH) 81 mg EC tablet, Take 81 mg by mouth, Disp: , Rfl:     Cholecalciferol (VITAMIN D3) 1000 units CAPS, Take by mouth daily, Disp: , Rfl:     docusate sodium (COLACE) 100 mg capsule, Take 100 mg by mouth daily  , Disp: , Rfl:     ferrous sulfate 325 (65 Fe) mg tablet, Take 325 mg by mouth daily with breakfast, Disp: , Rfl:     FLOVENT  MCG/ACT inhaler, INHALE 2 PUFFS TWICE A DAY AS NEEDED, Disp: , Rfl: 3    ibuprofen (MOTRIN) 800 mg tablet, Take by mouth every 6 (six) hours as needed for mild pain, Disp: , Rfl:     Lactobacillus (ACIDOPHILUS) 0 5 MG TABS, once a day, Disp: , Rfl:     lisinopril (ZESTRIL) 5 mg tablet, TAKE 1 TABLET BY MOUTH EVERY DAY, Disp: , Rfl:     lovastatin (MEVACOR) 20 mg tablet, Take 30 mg by mouth daily at bedtime  , Disp: , Rfl:     methocarbamol (ROBAXIN) 500 mg tablet, Take 500 mg by mouth 3 (three) times a day as needed  , Disp: , Rfl:     multivitamin (THERAGRAN) TABS, Take 1 tablet by mouth, Disp: , Rfl:     pantoprazole (PROTONIX) 40 mg tablet, Take 40 mg by mouth daily, Disp: , Rfl: 1    triamterene-hydrochlorothiazide (DYAZIDE) 37 5-25 mg per capsule, Take 1 capsule by mouth every morning  , Disp: , Rfl:     VENTOLIN  (90 Base) MCG/ACT inhaler, INHALE 2 PUFFS EVERY 4 (FOUR) HOURS AS NEEDED FOR WHEEZING , Disp: , Rfl: 5      Allergies  Iodinated diagnostic agents; Metrizamide; Penicillins; and Sulfamethoxazole-trimethoprim    Past Medical History  Past Medical History:   Diagnosis Date    Asthma     Cerebral brain hemorrhage (HCC)     Cerebrovascular small vessel disease     Chronic back pain     [facility] dem[facility] [address] [facility]     Diverticulitis     Hypercholesteremia     Hypertension     Incisional hernia     Numbness on right side     Right facial pain     Right hemiparesis (HCC)     Syncope and collapse     TIA (transient ischemic attack)          Past Surgical History:  Past Surgical History: Procedure Laterality Date     SECTION      x2    CHOLECYSTECTOMY      COLON SURGERY      COLOSTOMY      HERNIA REPAIR      1 right inguinal - 3 umbilical    OOPHORECTOMY      SALPINGECTOMY           Family History:  Family History   Problem Relation Age of Onset    Autoimmune disease Mother     Supraventricular tachycardia Mother     Stroke Father     Diabetes Father     Coronary artery disease Father     Hypertension Father        Social History:   reports that she has never smoked  She has never used smokeless tobacco  She reports that she does not drink alcohol or use drugs  I have reviewed the past medical history, surgical history, social and family history, current medications, allergies vitals, review of systems, and updated this information as appropriate today  Objective:    Physical Exam    Neurological Exam    GENERAL:  Cooperative in no acute distress  Well-developed and well-nourished    HEAD and NECK   Head is atraumatic normocephalic with no lesions or masses  Neck is supple with full range of motion    CARDIOVASCULAR  Carotid Arteries-no carotid bruits  NEUROLOGIC:  Mental Status-the patient is awake alert and oriented without aphasia or apraxia  Cranial Nerves: Visual fields are full to confrontation  Corrected visual acuity is 20/20 Extraocular movements are full without nystagmus  Pupils are 2-1/2 mm and reactive  Face is symmetrical to light touch  Movements of facial expression move symmetrically  Hearing is normal to finger rub bilaterally  Soft palate lifts symmetrically  Shoulder shrug is symmetrical  Tongue is midline without atrophy  Motor: No drift is noted on arm extension  Strength is full in the upper extremities bilaterally, left lower extremity strength is 5/5, right lower extremity strength is 5-/5 proximally and distally with normal bulk and tone  Sensory: Intact to temperature and vibratory sensation in the upper and lower extremities bilaterally  Cortical function is intact  Coordination: Finger to nose testing is performed accurately  Romberg is negative  Gait reveals a normal base with symmetrical arm swing  Reflexes:   2+ and symmetrical          ROS:  Review of Systems   Constitutional: Positive for fatigue  Negative for appetite change and fever  HENT: Negative  Negative for hearing loss, tinnitus, trouble swallowing and voice change  Eyes: Negative  Negative for photophobia, pain and visual disturbance  Respiratory: Positive for shortness of breath  Cardiovascular: Negative  Negative for palpitations  Gastrointestinal: Negative  Negative for nausea and vomiting  Endocrine: Negative  Negative for cold intolerance and heat intolerance  Genitourinary: Negative  Negative for dysuria, frequency and urgency  Musculoskeletal: Positive for back pain and neck pain  Negative for myalgias  Skin: Negative  Negative for rash  Neurological: Positive for weakness  Negative for dizziness, tremors, seizures, syncope, facial asymmetry, speech difficulty, light-headedness, numbness and headaches  Hematological: Negative  Does not bruise/bleed easily  Psychiatric/Behavioral: Negative  Negative for confusion, hallucinations and sleep disturbance

## 2018-10-19 ENCOUNTER — OFFICE VISIT (OUTPATIENT)
Dept: NEUROLOGY | Facility: CLINIC | Age: 48
End: 2018-10-19
Payer: COMMERCIAL

## 2018-10-19 VITALS
BODY MASS INDEX: 48.82 KG/M2 | SYSTOLIC BLOOD PRESSURE: 130 MMHG | DIASTOLIC BLOOD PRESSURE: 78 MMHG | HEART RATE: 86 BPM | WEIGHT: 293 LBS | HEIGHT: 65 IN

## 2018-10-19 DIAGNOSIS — M54.17 LUMBOSACRAL RADICULOPATHY AT L5: ICD-10-CM

## 2018-10-19 DIAGNOSIS — G56.01 CARPAL TUNNEL SYNDROME ON RIGHT: ICD-10-CM

## 2018-10-19 DIAGNOSIS — G81.91 RIGHT HEMIPARESIS (HCC): Primary | ICD-10-CM

## 2018-10-19 PROCEDURE — 99213 OFFICE O/P EST LOW 20 MIN: CPT | Performed by: PSYCHIATRY & NEUROLOGY

## 2018-10-19 NOTE — PROGRESS NOTES
Patient ID: Sharita Raphael is a 51 y o  female  Assessment/Plan:     Problem List Items Addressed This Visit        Nervous and Auditory    Right hemiparesis (HCC) - Primary    Lumbosacral radiculopathy at L5    Carpal tunnel syndrome on right         Mrs Belen Aj has presented for follow up on right -sided weakness which believe due to right CTS and right L5 radiculopathy  No new focal sensory or motor dysfunction has been described  Imaging of hr C/T spine were negative for demyelination  Patient repeated MRI brain in different facility, with no CD with imaging has been brought to her visit  Report is non-specific, and no acute ischemic or hemorrhagic changes described  Imaging from 2017 were reviewed again- I agree there is a concern for potential development of MS, but she does not meet [employer] criteria, with no DMT has been recommended  MRI lumbar spine will be considered as she had completed PT with no significant improvement described  Follow up with Dr Judi Padilla in March 2019  Subjective: right leg weakness    HPI/History of Present Illness  Mrs Kern has a history of hypertension, asthma, TIA, grade 1 diastolic dysfunction, right-sided kelly paresis, with right L5 radiculopathy and right hand CTS, multiple micro hemorrhages in the brain parnchyma, who presented for follow up on abnormal brain MRI  Blood pressure was described in the rabge 136/72- 140/78 mmHg  Patient has no signs of demyelination in her cervical or thoracic spine  CT lumbar was denied , with slight improvement with PT reported - we would like to consider MRI L-spine  Patient has been having hernias and she is off PT now  Chiropractor for lower back issue  No new weakness or numbness     No focal area of diffusion restriction to suggest acute infarct     Severe periventricular and white matter T2 hyperintensities likely due to precocious chronic small vessel ischemic changes      Foci of  hemosiderin deposition in the bilateral thalamus suggest chronic microhemorrhages, May be sequela of  hypertension   Patient had started antibacterial therapy for low garde fever with acute sphenoid sinus infection  The following portions of the patient's history were reviewed and updated as appropriate:   She  has a past medical history of Asthma; Cerebral brain hemorrhage ([facility] [address] ); Cerebrovascular small vessel disease; Chronic back pain; CNS demyelinating disorder ([facility] [address] ); Diverticulitis; Hypercholesteremia; Hypertension; Incisional hernia; Numbness on right side; Right facial pain; Right hemiparesis ([address] ); Syncope and collapse; and TIA (transient ischemic attack)  She   Patient Active Problem List    Diagnosis Date Noted    CNS demyelinating disease ([facility] [address] ) 2018    Right hemiparesis ([facility] [address] ) 2018    Lumbosacral radiculopathy at L5 2018    Nontraumatic multiple localized intracerebral hemorrhages ([facility] [address] ) 2018    Carpal tunnel syndrome on right 2018    Cerebrovascular small vessel disease 2017    HTN (hypertension) 10/02/2017     She  has a past surgical history that includes Colon surgery; Hernia repair; Colostomy; Cholecystectomy; Salpingectomy;  section; and Oophorectomy  Her family history includes Autoimmune disease in her mother; Coronary artery disease in her father; Diabetes in her father; Hypertension in her father; Stroke in her father; Supraventricular tachycardia in her mother  She  reports that she has never smoked  She has never used smokeless tobacco  She reports that she does not drink alcohol or use drugs    Current Outpatient Prescriptions   Medication Sig Dispense Refill    acetaminophen (TYLENOL) 500 mg tablet Take 500 mg by mouth every 6 (six) hours      aspirin (ECOTRIN LOW STRENGTH) 81 mg EC tablet Take 81 mg by mouth      Cholecalciferol (VITAMIN D3) 1000 units CAPS Take by mouth daily      docusate sodium (COLACE) 100 mg capsule Take 100 mg by mouth daily        ferrous sulfate 325 (65 Fe) mg tablet Take 325 mg by mouth daily with breakfast      FLOVENT  MCG/ACT inhaler INHALE 2 PUFFS TWICE A DAY AS NEEDED  3    ibuprofen (MOTRIN) 800 mg tablet Take by mouth every 6 (six) hours as needed for mild pain      Lactobacillus (ACIDOPHILUS) 0 5 MG TABS once a day      lisinopril (ZESTRIL) 5 mg tablet TAKE 1 TABLET BY MOUTH EVERY DAY      lovastatin (MEVACOR) 20 mg tablet Take 30 mg by mouth daily at bedtime        methocarbamol (ROBAXIN) 500 mg tablet Take 500 mg by mouth 3 (three) times a day as needed        multivitamin (THERAGRAN) TABS Take 1 tablet by mouth      pantoprazole (PROTONIX) 40 mg tablet Take 40 mg by mouth daily  1    triamterene-hydrochlorothiazide (DYAZIDE) 37 5-25 mg per capsule Take 1 capsule by mouth every morning        VENTOLIN  (90 Base) MCG/ACT inhaler INHALE 2 PUFFS EVERY 4 (FOUR) HOURS AS NEEDED FOR WHEEZING  5     No current facility-administered medications for this visit        Current Outpatient Prescriptions on File Prior to Visit   Medication Sig    acetaminophen (TYLENOL) 500 mg tablet Take 500 mg by mouth every 6 (six) hours    aspirin (ECOTRIN LOW STRENGTH) 81 mg EC tablet Take 81 mg by mouth    Cholecalciferol (VITAMIN D3) 1000 units CAPS Take by mouth daily    docusate sodium (COLACE) 100 mg capsule Take 100 mg by mouth daily      ferrous sulfate 325 (65 Fe) mg tablet Take 325 mg by mouth daily with breakfast    FLOVENT  MCG/ACT inhaler INHALE 2 PUFFS TWICE A DAY AS NEEDED    ibuprofen (MOTRIN) 800 mg tablet Take by mouth every 6 (six) hours as needed for mild pain    Lactobacillus (ACIDOPHILUS) 0 5 MG TABS once a day    lisinopril (ZESTRIL) 5 mg tablet TAKE 1 TABLET BY MOUTH EVERY DAY    lovastatin (MEVACOR) 20 mg tablet Take 30 mg by mouth daily at bedtime      methocarbamol (ROBAXIN) 500 mg tablet Take 500 mg by mouth 3 (three) times a day as needed      multivitamin (THERAGRAN) TABS Take 1 tablet by mouth    pantoprazole (PROTONIX) 40 mg tablet Take 40 mg by mouth daily    triamterene-hydrochlorothiazide (DYAZIDE) 37 5-25 mg per capsule Take 1 capsule by mouth every morning      VENTOLIN  (90 Base) MCG/ACT inhaler INHALE 2 PUFFS EVERY 4 (FOUR) HOURS AS NEEDED FOR WHEEZING  No current facility-administered medications on file prior to visit  She is allergic to iodinated diagnostic agents; metrizamide; penicillins; and sulfamethoxazole-trimethoprim            Objective:    Blood pressure 130/78, pulse 86, height 5' 4 5" (1 638 m), weight (!) 143 kg (316 lb)  Physical Exam/Neurological Exam  CONSTITUTIONAL: NAD, pleasant  NECK: supple, no lymphadenopathy, no thyromegaly, no JVD  CARDIOVASCULAR: RRR, normal S1S2, no murmurs, no rubs  RESP: clear to auscultation bilaterally, no wheezes/rhonchi/rales  ABDOMEN: soft, non tender, non distended  SKIN: no rash or skin lesions  EXTREMITIES: no edema, pulses 2+bilaterally  PSYCH: appropriate mood and affect  NEUROLOGIC COMPREHENSIVE EXAM: Patient is oriented to person, place and time, NAD; appropriate affect  CN II, III, IV, V, VI, VII,VIII,IX,X,XI-XII intact with EOMI, PERRLA, OKN intact, VF grossly intact, fundi poorly visualized secondary to pupillary constriction; symmetric face noted  Motor: 5/5 UE/LE bilateral symmetric, 4/5 right shoulder abduction, and elbow flexion; 5-/5 hip flexion on the right and left ; Sensory: intact to light touch and pinprick bilaterally; normal vibration sensation feet bilaterally; Coordination within normal limits on FTN and DU testing; DTR: 2/4 through, no Babinski, no clonus  Tandem gait is intact  Romberg: negative  ROS:  12 points of review of system was reviewed with the patient and was unremarkable with exception: see HPI  Review of Systems   Constitutional: Negative for appetite change and fever  HENT: Negative  Negative for hearing loss, tinnitus, trouble swallowing and voice change  Eyes: Negative    Negative for photophobia and pain  Respiratory: Negative  Negative for shortness of breath  Cardiovascular: Negative  Negative for palpitations  Gastrointestinal: Negative  Negative for nausea  Endocrine: Negative  Negative for cold intolerance and heat intolerance  Genitourinary: Negative  Negative for dysuria, frequency and urgency  Musculoskeletal: Positive for back pain  Negative for myalgias and neck pain  Skin: Negative  Allergic/Immunologic: Negative  Neurological: Positive for weakness (Right Leg)  Negative for dizziness, tremors, seizures, syncope, facial asymmetry, speech difficulty and numbness  Hematological: Negative  Does not bruise/bleed easily  Psychiatric/Behavioral: Negative for confusion and hallucinations

## 2018-10-25 ENCOUNTER — TELEPHONE (OUTPATIENT)
Dept: NEUROLOGY | Facility: CLINIC | Age: 48
End: 2018-10-25

## 2018-10-25 NOTE — TELEPHONE ENCOUNTER
Pt asks if Dr DUMONT plans to order the MRI Lspine  Please see last office note    Per previous encounter CT lumbar spine denied b/c PT was too far out of the window to be accepted    Please advise

## 2018-10-29 DIAGNOSIS — G37.9 CNS DEMYELINATION (HCC): Primary | ICD-10-CM

## 2018-10-29 DIAGNOSIS — R29.898 RIGHT LEG WEAKNESS: ICD-10-CM

## 2018-10-29 DIAGNOSIS — M47.26 OTHER SPONDYLOSIS WITH RADICULOPATHY, LUMBAR REGION: ICD-10-CM

## 2018-10-29 NOTE — TELEPHONE ENCOUNTER
MRI L-spine w/wo is available in [facility]  Please start prior-auth and send PT notes as well  CT L-spine was denied recently

## 2018-12-28 ENCOUNTER — DOCUMENTATION (OUTPATIENT)
Dept: NEUROLOGY | Facility: CLINIC | Age: 48
End: 2018-12-28

## 2019-01-15 ENCOUNTER — TELEPHONE (OUTPATIENT)
Dept: NEUROLOGY | Facility: CLINIC | Age: 49
End: 2019-01-15

## 2019-01-15 NOTE — TELEPHONE ENCOUNTER
Pt asking if Dr Toma Coleman has reviewed images from MRI brain done [facility], disc was dropped off a ferw months ago & have been uploaded inPACS  Please see documentation 12/28/18    Pt was told she could not schedule f/u w/Dr DUMONT until she had reviewed/compared images    Please advise

## 2019-01-19 NOTE — TELEPHONE ENCOUNTER
MRI brain 2017 and 2018 were reviewed - comparison was made, considering differences in MRI machines and cuts on [id number]  No significant changes has been appreciated in the total amount of white matter changes  Prior imaging of the spine were negative for spinal cord lesion  Patient is scheduled with Dr Nicolas Howard in March 2019, CSF analysis might be advised, if patient agreed

## 2019-01-23 NOTE — TELEPHONE ENCOUNTER
Reviewed Dr Elis Watson note with the patient  She was made aware no significant changes appreciated in the comparison of 2017 an 2018 brain MRIs  Patient verbalized understanding, will f/u with Dr Camron Fraga in March  No further questions

## 2019-03-21 ENCOUNTER — TELEPHONE (OUTPATIENT)
Dept: NEUROLOGY | Facility: CLINIC | Age: 49
End: 2019-03-21

## 2019-03-21 ENCOUNTER — OFFICE VISIT (OUTPATIENT)
Dept: NEUROLOGY | Facility: CLINIC | Age: 49
End: 2019-03-21
Payer: COMMERCIAL

## 2019-03-21 VITALS
WEIGHT: 293 LBS | BODY MASS INDEX: 48.82 KG/M2 | HEART RATE: 86 BPM | HEIGHT: 65 IN | SYSTOLIC BLOOD PRESSURE: 114 MMHG | DIASTOLIC BLOOD PRESSURE: 86 MMHG

## 2019-03-21 DIAGNOSIS — Z86.73 HISTORY OF TIA (TRANSIENT ISCHEMIC ATTACK): ICD-10-CM

## 2019-03-21 DIAGNOSIS — G56.01 CARPAL TUNNEL SYNDROME ON RIGHT: Primary | ICD-10-CM

## 2019-03-21 DIAGNOSIS — M54.17 LUMBOSACRAL RADICULOPATHY AT L5: ICD-10-CM

## 2019-03-21 PROCEDURE — 99214 OFFICE O/P EST MOD 30 MIN: CPT | Performed by: PSYCHIATRY & NEUROLOGY

## 2019-03-21 NOTE — PROGRESS NOTES
Salvatore Mehta is a 51 y o  female  Chief Complaint   Patient presents with    Follow-up     History of  CVA     CNS demyelinating disease       Assessment:  1  Carpal tunnel syndrome on right    2  Lumbosacral radiculopathy at L5    3  History of TIA (transient ischemic attack)          Discussion:  Patient is doing well since her last visit, her MRI scan of the brain did not show any acute changes, she has been in follow up with Dr Guevara Li who did not think patient would benefit from any medications for MS, patient is not keen to go for a spinal tap, she continues to be on a baby aspirin for her history of TIA and was advised to keep a blood pressure cholesterol and sugar under control, to continue with physical therapy for her low back pain, her MRI scan of the lumbar spine was denied by her insurance company, she has been advised in the past to follow up with her family physician regarding the lymph nodes on her carotid ultrasound stroke education given to the patient, to go to the hospital if has any worsening symptoms and call me otherwise to see me back in 6 months and follow up with her other physicians  Subjective:    HPI   Patient is here in follow-up for history of TIA and slight right-sided weakness and questionable demyelinating disorder on MRI scan without any clinical symptoms, since her last visit she is doing good, she saw Dr Guevara Li who did not think that patient needs to be on any disease modifying drugs for her condition, patient has slight low back pain on and off for which she is seeing a chiropractor and is in physical therapy but otherwise she is doing good, she denies any numbness tingling or pain in the hands, no other complaints      Vitals:    03/21/19 0955   BP: 114/86   BP Location: Left arm   Patient Position: Sitting   Cuff Size: Large   Pulse: 86   Weight: (!) 153 kg (337 lb)   Height: 5' 4 5" (1 638 m)       Current Medications    Current Outpatient Medications:    acetaminophen (TYLENOL) 500 mg tablet, Take 500 mg by mouth every 6 (six) hours, Disp: , Rfl:     aspirin (ECOTRIN LOW STRENGTH) 81 mg EC tablet, Take 81 mg by mouth, Disp: , Rfl:     Cholecalciferol (VITAMIN D3) 1000 units CAPS, Take by mouth daily, Disp: , Rfl:     docusate sodium (COLACE) 100 mg capsule, Take 100 mg by mouth daily  , Disp: , Rfl:     ibuprofen (MOTRIN) 800 mg tablet, Take by mouth every 6 (six) hours as needed for mild pain, Disp: , Rfl:     Lactobacillus (ACIDOPHILUS) 0 5 MG TABS, once a day, Disp: , Rfl:     lisinopril (ZESTRIL) 5 mg tablet, TAKE 1 TABLET BY MOUTH EVERY DAY, Disp: , Rfl:     lovastatin (MEVACOR) 20 mg tablet, Take 30 mg by mouth daily at bedtime  , Disp: , Rfl:     multivitamin (THERAGRAN) TABS, Take 1 tablet by mouth, Disp: , Rfl:     pantoprazole (PROTONIX) 40 mg tablet, Take 40 mg by mouth daily, Disp: , Rfl: 1    triamterene-hydrochlorothiazide (DYAZIDE) 37 5-25 mg per capsule, Take 1 capsule by mouth every morning  , Disp: , Rfl:     VENTOLIN  (90 Base) MCG/ACT inhaler, INHALE 2 PUFFS EVERY 4 (FOUR) HOURS AS NEEDED FOR WHEEZING , Disp: , Rfl: 5      Allergies  Iodinated diagnostic agents; Metrizamide; Penicillins; and Sulfamethoxazole-trimethoprim    Past Medical History  Past Medical History:   Diagnosis Date    Asthma     Cerebral brain hemorrhage (HCC)     Cerebrovascular small vessel disease     Chronic back pain     [facility]     Diverticulitis     Hypercholesteremia     Hypertension     Incisional hernia     Numbness on right side     Right facial pain     Right hemiparesis (HCC)     Syncope and collapse     TIA (transient ischemic attack)          Past Surgical History:  Past Surgical History:   Procedure Laterality Date     SECTION      x2    CHOLECYSTECTOMY      COLON SURGERY      COLOSTOMY      HERNIA REPAIR      1 right inguinal - 3 umbilical    OOPHORECTOMY      SALPINGECTOMY           Family History:  Family History   Problem Relation Age of Onset    Autoimmune disease Mother     Supraventricular tachycardia Mother     Stroke Father     Diabetes Father     Coronary artery disease Father     Hypertension Father        Social History:   reports that she has never smoked  She has never used smokeless tobacco  She reports that she does not drink alcohol or use drugs  I have reviewed the past medical history, surgical history, social and family history, current medications, allergies vitals, review of systems, and updated this information as appropriate today  Objective:    Physical Exam    Neurological Exam    GENERAL:  Cooperative in no acute distress  Well-developed and well-nourished    HEAD and NECK   Head is atraumatic normocephalic with no lesions or masses  Neck is supple with full range of motion    CARDIOVASCULAR  Carotid Arteries-no carotid bruits  NEUROLOGIC:  Mental Status-the patient is awake alert and oriented without aphasia or apraxia  Cranial Nerves: Visual fields are full to confrontation  Extraocular movements are full without nystagmus  Pupils are 2-1/2 mm and reactive  Face is symmetrical to light touch  Movements of facial expression move symmetrically  Hearing is normal to finger rub bilaterally  Soft palate lifts symmetrically  Shoulder shrug is symmetrical  Tongue is midline without atrophy  Motor: No drift is noted on arm extension  Strength is full in the upper and lower extremities with normal bulk and tone  With slightly poor effort on the right  Sensory: Intact to temperature and vibratory sensation in the upper and lower extremities bilaterally  Cortical function is intact  Coordination: Finger to nose testing is performed accurately  Gait reveals a normal base with symmetrical arm swing  Reflexes:      1+ and symmetrical  No spine tenderness          ROS:  Review of Systems   Constitutional: Negative  HENT: Negative  Eyes: Negative      Respiratory: Negative  Cardiovascular: Negative  Gastrointestinal: Negative  Endocrine: Negative  Genitourinary: Negative  Musculoskeletal: Positive for back pain and neck pain  Skin: Positive for wound (right hand raised painful area where EMG needle was placed )  Allergic/Immunologic: Negative  Neurological: Positive for weakness  Hematological: Negative  Psychiatric/Behavioral: Negative

## 2019-03-21 NOTE — TELEPHONE ENCOUNTER
Patient did not know what you meant when you were talking about the Lymph Node and needs more explanation of it  Her call back number is [phone]

## 2019-10-02 ENCOUNTER — OFFICE VISIT (OUTPATIENT)
Dept: NEUROLOGY | Facility: CLINIC | Age: 49
End: 2019-10-02
Payer: COMMERCIAL

## 2019-10-02 VITALS
WEIGHT: 293 LBS | HEART RATE: 84 BPM | BODY MASS INDEX: 48.82 KG/M2 | HEIGHT: 65 IN | SYSTOLIC BLOOD PRESSURE: 114 MMHG | DIASTOLIC BLOOD PRESSURE: 80 MMHG

## 2019-10-02 DIAGNOSIS — Z86.73 HISTORY OF TIA (TRANSIENT ISCHEMIC ATTACK): ICD-10-CM

## 2019-10-02 DIAGNOSIS — M54.17 LUMBOSACRAL RADICULOPATHY AT L5: ICD-10-CM

## 2019-10-02 DIAGNOSIS — G56.01 CARPAL TUNNEL SYNDROME ON RIGHT: Primary | ICD-10-CM

## 2019-10-02 DIAGNOSIS — G37.9 CNS DEMYELINATING DISEASE (HCC): ICD-10-CM

## 2019-10-02 PROCEDURE — 99214 OFFICE O/P EST MOD 30 MIN: CPT | Performed by: PSYCHIATRY & NEUROLOGY

## 2019-10-02 NOTE — PROGRESS NOTES
Yvon Hunt is a 47 y o  female  Chief Complaint   Patient presents with    Follow-up     carpal tunnel syndrome        Assessment:  1  Carpal tunnel syndrome on right    2  History of TIA (transient ischemic attack)    3  CNS demyelinating disease ([address] )    4  Lumbosacral radiculopathy at L5          Discussion:  Patient is doing well since her last visit, I have given her a prescription for wrist splint and occupational therapy for her carpal tunnel syndrome, she has seen Dr Rafal Chaves in the past who did not think patient would benefit from any medications for MS and she was not interested in going for a spinal tap and she does not have any clinical signs and symptoms, she has a history of TIA and is on a baby aspirin, stroke education given to the patient, she was advised to keep a blood pressure cholesterol and sugar under control, according to the patient she has had an MRI scan of the lumbar spine at an outside facility, we do not have the results we will try to obtain the same, I am not sure if patient had an MRI scan of the lumbar spine or she is confusing with an MRI scan of the brain that she had in the past but we will try to get the information, her low back pain is much better, she was advised to lose weight, and to maintain ideal body weight, to go to the hospital if has any worsening symptoms and call me otherwise to see me back in 6 months and follow up with her other physicians      Subjective:    HPI   Patient is here in follow-up for history of TIA and low back pain and carpal tunnel syndrome on the right hand, she has seen Dr Rafal Chaves in the past and did not feel that she has any active demyelination going on and she did not feel the need for patient to be on any disease modifying drugs, her low back pain is much better she has some occasional low back pain of 1-2 on 10 without much radiation into the legs, she has not been using a cane, she is in physical therapy which seems to be helping her, she has some occasional numbness and tingling in the right hand mostly in the median nerve distribution, no focal weakness she denies any other symptoms, no other complaints      Vitals:    10/02/19 1013   BP: 114/80   BP Location: Left arm   Patient Position: Sitting   Cuff Size: Large   Pulse: 84   Weight: (!) 153 kg (337 lb)   Height: 5' 4 5" (1 638 m)       Current Medications    Current Outpatient Medications:     aspirin (ECOTRIN LOW STRENGTH) 81 mg EC tablet, Take 81 mg by mouth daily , Disp: , Rfl:     Cholecalciferol (VITAMIN D3) 1000 units CAPS, Take by mouth daily, Disp: , Rfl:     docusate sodium (COLACE) 100 mg capsule, Take 100 mg by mouth daily  , Disp: , Rfl:     ibuprofen (MOTRIN) 800 mg tablet, Take by mouth as needed for mild pain , Disp: , Rfl:     Lactobacillus (ACIDOPHILUS) 0 5 MG TABS, once a day, Disp: , Rfl:     lisinopril (ZESTRIL) 5 mg tablet, TAKE 1 TABLET BY MOUTH EVERY DAY, Disp: , Rfl:     lovastatin (MEVACOR) 20 mg tablet, Take 30 mg by mouth daily at bedtime  , Disp: , Rfl:     multivitamin (THERAGRAN) TABS, Take 1 tablet by mouth daily , Disp: , Rfl:     pantoprazole (PROTONIX) 40 mg tablet, Take 40 mg by mouth daily, Disp: , Rfl: 1    triamterene-hydrochlorothiazide (DYAZIDE) 37 5-25 mg per capsule, Take 1 capsule by mouth every morning  , Disp: , Rfl:     VENTOLIN  (90 Base) MCG/ACT inhaler, INHALE 2 PUFFS EVERY 4 (FOUR) HOURS AS NEEDED FOR WHEEZING , Disp: , Rfl: 5      Allergies  Iodinated diagnostic agents; Metrizamide; Penicillins; and Sulfamethoxazole-trimethoprim    Past Medical History  Past Medical History:   Diagnosis Date    Asthma     Cerebral brain hemorrhage (HCC)     Cerebrovascular small vessel disease     Chronic back pain     CNS demyelinating disorder ([facility] [address] )     Diverticulitis     Hypercholesteremia     Hypertension     Incisional hernia     Numbness on right side     Right facial pain     Right hemiparesis (HCC)     Syncope and collapse     TIA (transient ischemic attack)          Past Surgical History:  Past Surgical History:   Procedure Laterality Date     SECTION      x2    CHOLECYSTECTOMY      COLON SURGERY      COLOSTOMY      HERNIA REPAIR      1 right inguinal - 3 umbilical    OOPHORECTOMY      SALPINGECTOMY           Family History:  Family History   Problem Relation Age of Onset    Autoimmune disease Mother     Supraventricular tachycardia Mother     Stroke Father     Diabetes Father     Coronary artery disease Father     Hypertension Father        Social History:   reports that she has never smoked  She has never used smokeless tobacco  She reports that she does not drink alcohol or use drugs  I have reviewed the past medical history, surgical history, social and family history, current medications, allergies vitals, review of systems, and updated this information as appropriate today  Objective:    Physical Exam    Neurological Exam    GENERAL:  Cooperative in no acute distress  Well-developed and well-nourished    HEAD and NECK   Head is atraumatic normocephalic with no lesions or masses  Neck is supple with full range of motion    CARDIOVASCULAR  Carotid Arteries-no carotid bruits  NEUROLOGIC:  Mental Status-the patient is awake alert and oriented without aphasia or apraxia  Cranial Nerves: Visual fields are full to confrontation    Extraocular movements are full without nystagmus  Pupils are 2-1/2 mm and reactive  Face is symmetrical to light touch  Movements of facial expression move symmetrically  Hearing is normal to finger rub bilaterally  Soft palate lifts symmetrically  Shoulder shrug is symmetrical  Tongue is midline without atrophy  Motor: No drift is noted on arm extension  Strength is full in the upper and lower extremities with normal bulk and tone  Sensory: Intact to temperature and vibratory sensation in the upper and lower extremities bilaterally   Cortical function is intact  Coordination: Finger to nose testing is performed accurately  Romberg is negative  Gait reveals a normal base with symmetrical arm swing  Reflexes:    1+ and symmetrical   No spine tenderness  ROS:  Review of Systems   Constitutional: Negative  Negative for appetite change, chills, fatigue and fever  HENT: Negative  Negative for hearing loss, tinnitus, trouble swallowing and voice change  Eyes: Negative  Negative for photophobia, pain and visual disturbance  Respiratory: Negative  Negative for shortness of breath and wheezing  Cardiovascular: Negative  Negative for chest pain and palpitations  Gastrointestinal: Negative  Negative for nausea and vomiting  Endocrine: Negative  Negative for cold intolerance and heat intolerance  Genitourinary: Negative  Negative for dysuria, frequency and urgency  Musculoskeletal: Positive for back pain, neck pain and neck stiffness  Negative for arthralgias, gait problem and myalgias  Skin: Negative  Negative for rash  Allergic/Immunologic: Negative  Neurological: Positive for weakness (right side)  Negative for dizziness, tremors, seizures, syncope, facial asymmetry, speech difficulty, light-headedness, numbness and headaches  Hematological: Negative  Does not bruise/bleed easily  Psychiatric/Behavioral: Negative  Negative for confusion, decreased concentration, hallucinations and sleep disturbance

## 2020-05-08 DIAGNOSIS — K21.9 GASTROESOPHAGEAL REFLUX DISEASE, ESOPHAGITIS PRESENCE NOT SPECIFIED: ICD-10-CM

## 2020-05-08 DIAGNOSIS — K21.00 REFLUX ESOPHAGITIS: Primary | ICD-10-CM

## 2020-05-12 RX ORDER — PANTOPRAZOLE SODIUM 40 MG/1
40 TABLET, DELAYED RELEASE ORAL DAILY
Qty: 30 TABLET | Refills: 3 | Status: SHIPPED | OUTPATIENT
Start: 2020-05-12 | End: 2020-08-31

## 2020-05-14 ENCOUNTER — TELEMEDICINE (OUTPATIENT)
Dept: NEUROLOGY | Facility: CLINIC | Age: 50
End: 2020-05-14
Payer: COMMERCIAL

## 2020-05-14 VITALS
BODY MASS INDEX: 47.09 KG/M2 | DIASTOLIC BLOOD PRESSURE: 73 MMHG | HEIGHT: 66 IN | WEIGHT: 293 LBS | HEART RATE: 88 BPM | SYSTOLIC BLOOD PRESSURE: 120 MMHG

## 2020-05-14 DIAGNOSIS — Z86.73 HISTORY OF TIA (TRANSIENT ISCHEMIC ATTACK): ICD-10-CM

## 2020-05-14 DIAGNOSIS — G56.01 CARPAL TUNNEL SYNDROME ON RIGHT: Primary | ICD-10-CM

## 2020-05-14 DIAGNOSIS — M54.17 LUMBOSACRAL RADICULOPATHY AT L5: ICD-10-CM

## 2020-05-14 DIAGNOSIS — G37.9 CNS DEMYELINATING DISEASE (HCC): ICD-10-CM

## 2020-05-14 PROCEDURE — 99214 OFFICE O/P EST MOD 30 MIN: CPT | Performed by: PSYCHIATRY & NEUROLOGY

## 2020-05-14 RX ORDER — MONTELUKAST SODIUM 10 MG/1
10 TABLET ORAL
COMMUNITY
Start: 2020-04-09

## 2020-05-14 RX ORDER — ATORVASTATIN CALCIUM 20 MG/1
20 TABLET, FILM COATED ORAL DAILY
COMMUNITY
Start: 2019-10-15 | End: 2020-10-14

## 2020-08-30 DIAGNOSIS — K21.9 GASTROESOPHAGEAL REFLUX DISEASE, ESOPHAGITIS PRESENCE NOT SPECIFIED: ICD-10-CM

## 2020-08-31 RX ORDER — PANTOPRAZOLE SODIUM 40 MG/1
TABLET, DELAYED RELEASE ORAL
Qty: 30 TABLET | Refills: 3 | Status: SHIPPED | OUTPATIENT
Start: 2020-08-31 | End: 2020-12-16

## 2020-12-16 DIAGNOSIS — K21.9 GASTROESOPHAGEAL REFLUX DISEASE: ICD-10-CM

## 2020-12-16 RX ORDER — PANTOPRAZOLE SODIUM 40 MG/1
TABLET, DELAYED RELEASE ORAL
Qty: 30 TABLET | Refills: 3 | Status: SHIPPED | OUTPATIENT
Start: 2020-12-16 | End: 2021-05-06

## 2021-02-16 ENCOUNTER — TRANSCRIBE ORDERS (OUTPATIENT)
Dept: NEUROLOGY | Facility: CLINIC | Age: 51
End: 2021-02-16

## 2021-02-16 DIAGNOSIS — M54.17 RADICULOPATHY, LUMBOSACRAL REGION: ICD-10-CM

## 2021-02-16 DIAGNOSIS — G37.9 DEMYELINATING DISEASE OF CENTRAL NERVOUS SYSTEM, UNSPECIFIED (HCC): ICD-10-CM

## 2021-02-16 DIAGNOSIS — G37.9 DEMYELINATING DISEASE OF CENTRAL NERVOUS SYSTEM, UNSPECIFIED (HCC): Primary | ICD-10-CM

## 2021-02-16 DIAGNOSIS — G56.01 CARPAL TUNNEL SYNDROME, RIGHT UPPER LIMB: ICD-10-CM

## 2021-05-06 DIAGNOSIS — K21.9 GASTROESOPHAGEAL REFLUX DISEASE: ICD-10-CM

## 2021-05-06 RX ORDER — PANTOPRAZOLE SODIUM 40 MG/1
TABLET, DELAYED RELEASE ORAL
Qty: 30 TABLET | Refills: 3 | Status: SHIPPED | OUTPATIENT
Start: 2021-05-06 | End: 2021-10-25

## 2021-10-25 DIAGNOSIS — K21.9 GASTROESOPHAGEAL REFLUX DISEASE: ICD-10-CM

## 2021-10-25 RX ORDER — PANTOPRAZOLE SODIUM 40 MG/1
TABLET, DELAYED RELEASE ORAL
Qty: 30 TABLET | Refills: 3 | Status: SHIPPED | OUTPATIENT
Start: 2021-10-25 | End: 2022-03-14

## 2022-03-12 DIAGNOSIS — K21.9 GASTROESOPHAGEAL REFLUX DISEASE: ICD-10-CM

## 2022-03-14 RX ORDER — PANTOPRAZOLE SODIUM 40 MG/1
TABLET, DELAYED RELEASE ORAL
Qty: 30 TABLET | Refills: 3 | Status: SHIPPED | OUTPATIENT
Start: 2022-03-14 | End: 2022-07-11

## 2022-07-10 DIAGNOSIS — K21.9 GASTROESOPHAGEAL REFLUX DISEASE: ICD-10-CM

## 2022-07-11 RX ORDER — PANTOPRAZOLE SODIUM 40 MG/1
TABLET, DELAYED RELEASE ORAL
Qty: 90 TABLET | Refills: 1 | Status: SHIPPED | OUTPATIENT
Start: 2022-07-11

## 2023-03-05 DIAGNOSIS — K21.9 GASTROESOPHAGEAL REFLUX DISEASE: ICD-10-CM

## 2023-03-06 RX ORDER — PANTOPRAZOLE SODIUM 40 MG/1
TABLET, DELAYED RELEASE ORAL
Qty: 90 TABLET | Refills: 1 | Status: SHIPPED | OUTPATIENT
Start: 2023-03-06

## 2023-03-08 NOTE — PLAN OF CARE
Problem: DISCHARGE PLANNING - CARE MANAGEMENT  Goal: Discharge to post-acute care or home with appropriate resources  INTERVENTIONS:  - Conduct assessment to determine patient/family and health care team treatment goals, and need for post-acute services based on payer coverage, community resources, and patient preferences, and barriers to discharge  - Address psychosocial, clinical, and financial barriers to discharge as identified in assessment in conjunction with the patient/family and health care team  - Arrange appropriate level of post-acute services according to patient's   needs and preference and payer coverage in collaboration with the physician and health care team  - Communicate with and update the patient/family, physician, and health care team regarding progress on the discharge plan  - Arrange appropriate transportation to post-acute venues    Outcome: Completed Date Met: 10/03/17  CM met with pt at bedside  She is to be discharged home w/no needs  Daughter-in-law Avi Sierra will transport home  13

## 2023-09-29 DIAGNOSIS — K21.9 GASTROESOPHAGEAL REFLUX DISEASE: ICD-10-CM

## 2023-09-29 NOTE — TELEPHONE ENCOUNTER
Spoke to patient, she will schedule an appointment through [address] [place] [address], has transportation issues

## 2023-10-02 RX ORDER — PANTOPRAZOLE SODIUM 40 MG/1
40 TABLET, DELAYED RELEASE ORAL DAILY
Qty: 30 TABLET | Refills: 0 | Status: SHIPPED | OUTPATIENT
Start: 2023-10-02

## 2023-10-30 DIAGNOSIS — K21.9 GASTROESOPHAGEAL REFLUX DISEASE: ICD-10-CM

## 2023-10-30 RX ORDER — PANTOPRAZOLE SODIUM 40 MG/1
40 TABLET, DELAYED RELEASE ORAL DAILY
Qty: 90 TABLET | Refills: 1 | Status: SHIPPED | OUTPATIENT
Start: 2023-10-30

## 2024-04-24 DIAGNOSIS — K21.9 GASTROESOPHAGEAL REFLUX DISEASE: ICD-10-CM

## 2024-04-24 RX ORDER — PANTOPRAZOLE SODIUM 40 MG/1
40 TABLET, DELAYED RELEASE ORAL DAILY
Qty: 90 TABLET | Refills: 1 | Status: SHIPPED | OUTPATIENT
Start: 2024-04-24

## 2024-12-25 DIAGNOSIS — K21.9 GASTROESOPHAGEAL REFLUX DISEASE: ICD-10-CM

## 2024-12-26 RX ORDER — PANTOPRAZOLE SODIUM 40 MG/1
40 TABLET, DELAYED RELEASE ORAL DAILY
Qty: 90 TABLET | Refills: 1 | Status: SHIPPED | OUTPATIENT
Start: 2024-12-26